# Patient Record
Sex: MALE | Race: WHITE | NOT HISPANIC OR LATINO | Employment: OTHER | URBAN - NONMETROPOLITAN AREA
[De-identification: names, ages, dates, MRNs, and addresses within clinical notes are randomized per-mention and may not be internally consistent; named-entity substitution may affect disease eponyms.]

---

## 2017-01-04 ENCOUNTER — TELEPHONE (OUTPATIENT)
Dept: CARDIOLOGY | Facility: CLINIC | Age: 67
End: 2017-01-04

## 2017-01-04 ENCOUNTER — OFFICE VISIT (OUTPATIENT)
Dept: CARDIOLOGY | Facility: CLINIC | Age: 67
End: 2017-01-04

## 2017-01-04 VITALS
BODY MASS INDEX: 32.78 KG/M2 | SYSTOLIC BLOOD PRESSURE: 144 MMHG | DIASTOLIC BLOOD PRESSURE: 92 MMHG | HEART RATE: 60 BPM | HEIGHT: 72 IN | WEIGHT: 242 LBS

## 2017-01-04 DIAGNOSIS — R53.83 OTHER FATIGUE: Primary | ICD-10-CM

## 2017-01-04 DIAGNOSIS — E78.49 OTHER HYPERLIPIDEMIA: ICD-10-CM

## 2017-01-04 DIAGNOSIS — E03.8 OTHER SPECIFIED HYPOTHYROIDISM: ICD-10-CM

## 2017-01-04 DIAGNOSIS — I25.118 CORONARY ARTERY DISEASE INVOLVING NATIVE CORONARY ARTERY OF NATIVE HEART WITH OTHER FORM OF ANGINA PECTORIS (HCC): ICD-10-CM

## 2017-01-04 DIAGNOSIS — G47.39 OTHER SLEEP APNEA: ICD-10-CM

## 2017-01-04 DIAGNOSIS — I10 ESSENTIAL HYPERTENSION: ICD-10-CM

## 2017-01-04 PROBLEM — I25.10 CAD (CORONARY ARTERY DISEASE): Status: ACTIVE | Noted: 2017-01-04

## 2017-01-04 PROBLEM — E03.9 HYPOTHYROID: Status: ACTIVE | Noted: 2017-01-04

## 2017-01-04 PROBLEM — G47.30 SLEEP APNEA: Status: ACTIVE | Noted: 2017-01-04

## 2017-01-04 PROCEDURE — 93000 ELECTROCARDIOGRAM COMPLETE: CPT | Performed by: NURSE PRACTITIONER

## 2017-01-04 PROCEDURE — 99214 OFFICE O/P EST MOD 30 MIN: CPT | Performed by: NURSE PRACTITIONER

## 2017-01-04 RX ORDER — FENOFIBRIC ACID 135 MG/1
135 CAPSULE, DELAYED RELEASE ORAL DAILY
Qty: 90 CAPSULE | Refills: 2 | Status: SHIPPED | OUTPATIENT
Start: 2017-01-04 | End: 2017-02-27 | Stop reason: SDUPTHER

## 2017-01-04 RX ORDER — MONTELUKAST SODIUM 10 MG/1
10 TABLET ORAL NIGHTLY
COMMUNITY
End: 2017-07-05 | Stop reason: ALTCHOICE

## 2017-01-04 RX ORDER — FLUTICASONE PROPIONATE 50 MCG
2 SPRAY, SUSPENSION (ML) NASAL 2 TIMES DAILY
COMMUNITY
End: 2018-01-03 | Stop reason: ALTCHOICE

## 2017-01-04 RX ORDER — LEVOTHYROXINE SODIUM 0.1 MG/1
100 TABLET ORAL DAILY
COMMUNITY
End: 2018-07-11 | Stop reason: DRUGHIGH

## 2017-01-04 RX ORDER — AMLODIPINE BESYLATE 2.5 MG/1
2.5 TABLET ORAL DAILY
Qty: 30 TABLET | Refills: 8 | Status: SHIPPED | OUTPATIENT
Start: 2017-01-04 | End: 2017-01-04 | Stop reason: SDUPTHER

## 2017-01-04 RX ORDER — SODIUM PHOSPHATE,MONO-DIBASIC 19G-7G/118
ENEMA (ML) RECTAL 3 TIMES DAILY
COMMUNITY
End: 2017-07-05 | Stop reason: ALTCHOICE

## 2017-01-04 RX ORDER — AMLODIPINE BESYLATE 2.5 MG/1
2.5 TABLET ORAL DAILY
Qty: 30 TABLET | Refills: 0 | Status: SHIPPED | OUTPATIENT
Start: 2017-01-04 | End: 2017-01-30 | Stop reason: SDUPTHER

## 2017-01-04 RX ORDER — FENOFIBRIC ACID 135 MG/1
CAPSULE, DELAYED RELEASE ORAL DAILY
COMMUNITY
End: 2017-01-04 | Stop reason: SDUPTHER

## 2017-01-04 NOTE — LETTER
January 4, 2017     JAMEE Lal  [address] [facility] [address]    Patient: Carrington Fontaine   YOB: 1950   Date of Visit: 1/4/2017       Dear JAMEE Lawson:    Carrington Fontaine was in my office today. Below are the relevant portions of my assessment and plan of care.        HR is stable.  BP is still slightly elevated.  We will advise to add a low dose of norvasc daily.  EKG done today for HTN, showed sinus cara with normal QT.  Advised patient to have his CPAP mask checked as seal may not be tight causing leak which could be causing fatigue and hypertension.  We will also advise on repeat cardiac workup with stress and echo to look for any ischemia or decline in LV function that may be causing fatigue.  More recommendations to follow.  Labs and refills with you.  6 month follow up or sooner if needed.      Problems Addressed this Visit        Cardiovascular and Mediastinum    CAD (coronary artery disease)    Relevant Medications    amLODIPine (NORVASC) 2.5 MG tablet    Other Relevant Orders    Stress Test With Myocardial Perfusion One Day    Adult Transthoracic Echo Complete    ECG 12 Lead    HTN (hypertension)    Relevant Medications    amLODIPine (NORVASC) 2.5 MG tablet    Other Relevant Orders    Stress Test With Myocardial Perfusion One Day    Adult Transthoracic Echo Complete       Other    Sleep apnea    Relevant Orders    Stress Test With Myocardial Perfusion One Day    Adult Transthoracic Echo Complete      Other Visit Diagnoses     Other fatigue    -  Primary    Relevant Orders    Stress Test With Myocardial Perfusion One Day    Adult Transthoracic Echo Complete    Other hyperlipidemia        Relevant Medications    fenofibric acid (TRILIPIX) 135 MG capsule delayed-release delayed release capsule    Other specified hypothyroidism        Relevant Medications    levothyroxine (SYNTHROID, LEVOTHROID) 100 MCG tablet              Electronically signed by JAMEE Fish  January 4, 2017 3:47 PM              If you have questions, please do not hesitate to call me. I look forward to following Carrington along with you.         Sincerely,        Aida March, JAMEE        CC: No Recipients

## 2017-01-04 NOTE — TELEPHONE ENCOUNTER
Patient asking if we could send a 30 day supply of norvasc that was added today to his local pharmacy so he can get it sooner. Script sent to [facility] in [place] per patient request.

## 2017-01-04 NOTE — PROGRESS NOTES
Chief Complaint   Patient presents with   • Follow-up     Reports that B/P is still running high after change in Cozaar.  Last labs about 6 months ago at [facility]. Needs refills on Fenofibrate -[facility] 90[facility].       Cardiac Complaints  fatigue      Subjective   Carrington Fontaine is a 66 y.o. male with a history of hypertension and a family history of ischemic heart disease who was referred to Dr. Painter in the past for cardiac evaluation of chest tightness and shortness of breath. He also has sleep apnea for which he uses CPAP. His cardiac workup in 2014 revealed significant coronary artery disease. He was referred to Dr. Harrell and underwent coronary artery bypass grafting ×4. Postoperatively he developed atrial fibrillation. A transesophageal echocardiogram was done and then underwent cardioversion without success. Later, he converted and remained in sinus rhythm so Coumadin and amiodarone were stopped. Later he developed dizziness and metoprolol was stopped.  He returns today for follow up and reports more fatigue and hypertension than prior.  Wife if concerned about symptoms as these were symptoms he had when he had CABG x4.  Labs and refills he reports with PCP except for fenofibrate which he needs today.      Cardiac History  Past Surgical History   Procedure Laterality Date   • Knee surgery Right    • Cyst removal       from mouth   • Colonoscopy     • Echo - converted  08/29/2014     Echo-EF 65%   • Cardiovascular stress test  08/29/2014     Stress-9min, 152/74, inferiolateral ischemia, Imdur, cath   • Cardiac catheterization  09/14/2014     Cardiac Cath-EF 60%, significant 3 vessel disease, refer for bypass.   • Coronary artery bypass graft  09/22/2014     CABG-() LIMA-LAD, SVG-OM, SVG-PDA, SVG-RCA.   • Murali  09/25/2014     MURALI-EF 50%, no mass, unsuccessful cardioversion x 4.   • Umbilical hernia repair         Current Outpatient Prescriptions   Medication Sig Dispense Refill    • aspirin  MG tablet Take 325 mg by mouth daily.     • fenofibric acid (TRILIPIX) 135 MG capsule delayed-release delayed release capsule Take 1 capsule by mouth Daily for 90 days. 90 capsule 2   • finasteride (PROSCAR) 5 MG tablet Take 5 mg by mouth daily.     • FLUoxetine (PROzac) 20 MG capsule Take 20 mg by mouth. Takes 2 tabs QD.     • fluticasone (FLONASE) 50 MCG/ACT nasal spray 2 sprays into each nostril 2 (Two) Times a Day.     • glucosamine-chondroitin 500-400 MG capsule capsule Take  by mouth 3 (Three) Times a Day.     • ibuprofen (ADVIL,MOTRIN) 800 MG tablet Take 800 mg by mouth 3 (Three) Times a Day.     • levothyroxine (SYNTHROID, LEVOTHROID) 100 MCG tablet Take 100 mcg by mouth Daily.     • losartan (COZAAR) 50 MG tablet Take 1 tablet by mouth 2 (Two) Times a Day. 180 tablet 3   • montelukast (SINGULAIR) 10 MG tablet Take 10 mg by mouth Every Night.     • omeprazole (PriLOSEC) 20 MG capsule Take 20 mg by mouth daily.     • tadalafil (CIALIS) 5 MG tablet Take 5 mg by mouth daily as needed for erectile dysfunction.     • amLODIPine (NORVASC) 2.5 MG tablet Take 1 tablet by mouth Daily for 30 days. 30 tablet 8     No current facility-administered medications for this visit.        Lisinopril and Penicillins    Past Medical History   Diagnosis Date   • Arthritis    • Coronary artery disease    • Cyst of mouth      removed    • H/O colonoscopy    • H/O right knee surgery    • Hypertension        Social History     Social History   • Marital status:      Spouse name: N/A   • Number of children: N/A   • Years of education: N/A     Occupational History   • Not on file.     Social History Main Topics   • Smoking status: Former Smoker     Quit date: 1973   • Smokeless tobacco: Never Used      Comment: history of smoking x 5 years, quit at 22 years old..    • Alcohol use Yes      Comment: Socially maybe one glass of wine per month   • Drug use: No   • Sexual activity: Not on file     Other Topics  "Concern   • Not on file     Social History Narrative       Family History   Problem Relation Age of Onset   • Cancer Mother    • Diabetes Mother    • Melanoma Father        Review of Systems   Constitutional: Positive for fatigue.   HENT: Negative.    Respiratory: Negative.    Cardiovascular: Negative.    Musculoskeletal: Negative.    Skin: Negative.    Neurological: Negative.    Psychiatric/Behavioral: Negative.        DiabetesNo  Thyroidabnormal    Objective     Visit Vitals   • /92   • Pulse 60   • Ht 72\" (182.9 cm)   • Wt 242 lb (110 kg)   • BMI 32.82 kg/m2       Physical Exam   Constitutional: He is oriented to person, place, and time. He appears well-developed and well-nourished.   HENT:   Head: Normocephalic and atraumatic.   Eyes: EOM are normal. Pupils are equal, round, and reactive to light.   Cardiovascular: Normal rate and regular rhythm.    Murmur heard.  Pulmonary/Chest: Effort normal and breath sounds normal.   Abdominal: Soft.   Musculoskeletal: Normal range of motion.   Neurological: He is alert and oriented to person, place, and time.   Skin: Skin is warm and dry.   Psychiatric: He has a normal mood and affect.         ECG 12 Lead  Date/Time: 1/4/2017 1:06 PM  Performed by: MANE HANSON  Authorized by: MANE HANSON   Rhythm: sinus bradycardia  BPM: 57  Clinical impression: normal ECG            Assessment/Plan     HR is stable.  BP is still slightly elevated.  We will advise to add a low dose of norvasc daily.  EKG done today for HTN, showed sinus cara with normal QT.  Advised patient to have his CPAP mask checked as seal may not be tight causing leak which could be causing fatigue and hypertension.  We will also advise on repeat cardiac workup with stress and echo to look for any ischemia or decline in LV function that may be causing fatigue.  More recommendations to follow.  Labs and refills with you.  6 month follow up or sooner if needed.      Problems Addressed this Visit        " Cardiovascular and Mediastinum    CAD (coronary artery disease)    Relevant Medications    amLODIPine (NORVASC) 2.5 MG tablet    Other Relevant Orders    Stress Test With Myocardial Perfusion One Day    Adult Transthoracic Echo Complete    ECG 12 Lead    HTN (hypertension)    Relevant Medications    amLODIPine (NORVASC) 2.5 MG tablet    Other Relevant Orders    Stress Test With Myocardial Perfusion One Day    Adult Transthoracic Echo Complete       Other    Sleep apnea    Relevant Orders    Stress Test With Myocardial Perfusion One Day    Adult Transthoracic Echo Complete      Other Visit Diagnoses     Other fatigue    -  Primary    Relevant Orders    Stress Test With Myocardial Perfusion One Day    Adult Transthoracic Echo Complete    Other hyperlipidemia        Relevant Medications    fenofibric acid (TRILIPIX) 135 MG capsule delayed-release delayed release capsule    Other specified hypothyroidism        Relevant Medications    levothyroxine (SYNTHROID, LEVOTHROID) 100 MCG tablet              Electronically signed by Aida March, JAMEE January 4, 2017 3:47 PM

## 2017-01-30 ENCOUNTER — TELEPHONE (OUTPATIENT)
Dept: CARDIOLOGY | Facility: CLINIC | Age: 67
End: 2017-01-30

## 2017-01-30 RX ORDER — AMLODIPINE BESYLATE 2.5 MG/1
2.5 TABLET ORAL DAILY
Qty: 90 TABLET | Refills: 3 | Status: SHIPPED | OUTPATIENT
Start: 2017-01-30 | End: 2017-03-01

## 2017-02-27 ENCOUNTER — TELEPHONE (OUTPATIENT)
Dept: CARDIOLOGY | Facility: CLINIC | Age: 67
End: 2017-02-27

## 2017-02-27 RX ORDER — FENOFIBRIC ACID 135 MG/1
135 CAPSULE, DELAYED RELEASE ORAL DAILY
Qty: 90 CAPSULE | Refills: 2 | Status: SHIPPED | OUTPATIENT
Start: 2017-02-27 | End: 2017-05-28

## 2017-02-27 NOTE — TELEPHONE ENCOUNTER
Patient called in requesting fenofibrate refills to be sent into [facility] pharmacy instead of [facility] pharmacy. Script sent into [facility]. Patient aware.

## 2017-07-05 ENCOUNTER — OFFICE VISIT (OUTPATIENT)
Dept: CARDIOLOGY | Facility: CLINIC | Age: 67
End: 2017-07-05

## 2017-07-05 VITALS
BODY MASS INDEX: 32.78 KG/M2 | HEIGHT: 72 IN | WEIGHT: 242 LBS | SYSTOLIC BLOOD PRESSURE: 148 MMHG | HEART RATE: 64 BPM | DIASTOLIC BLOOD PRESSURE: 90 MMHG

## 2017-07-05 DIAGNOSIS — G47.39 OTHER SLEEP APNEA: ICD-10-CM

## 2017-07-05 DIAGNOSIS — E03.8 OTHER SPECIFIED HYPOTHYROIDISM: ICD-10-CM

## 2017-07-05 DIAGNOSIS — I25.10 CORONARY ARTERY DISEASE INVOLVING NATIVE CORONARY ARTERY OF NATIVE HEART WITHOUT ANGINA PECTORIS: Primary | ICD-10-CM

## 2017-07-05 DIAGNOSIS — I10 ESSENTIAL HYPERTENSION: ICD-10-CM

## 2017-07-05 PROCEDURE — 99213 OFFICE O/P EST LOW 20 MIN: CPT | Performed by: NURSE PRACTITIONER

## 2017-07-05 RX ORDER — AMLODIPINE BESYLATE 5 MG/1
5 TABLET ORAL DAILY
Qty: 90 TABLET | Refills: 3 | Status: SHIPPED | OUTPATIENT
Start: 2017-07-05 | End: 2017-07-05 | Stop reason: SDUPTHER

## 2017-07-05 RX ORDER — AMLODIPINE BESYLATE 5 MG/1
5 TABLET ORAL DAILY
Qty: 90 TABLET | Refills: 3 | Status: SHIPPED | OUTPATIENT
Start: 2017-07-05 | End: 2017-07-11 | Stop reason: SDUPTHER

## 2017-07-05 RX ORDER — AMLODIPINE BESYLATE 2.5 MG/1
2.5 TABLET ORAL DAILY
COMMUNITY
End: 2017-07-05 | Stop reason: DRUGHIGH

## 2017-07-05 NOTE — PROGRESS NOTES
Chief Complaint   Patient presents with   • Follow-up     He denies any chest pain, no palpitation, no dizziness and no shortness of breath. He reports no recent labs. He does not need refill at this time. He states B/P has been elevated some lately, relates to stress.       Cardiac Complaints  none      Subjective   Carrington Fontaine is a 66 y.o. male with a history of hypertension and a family history of ischemic heart disease who was referred to Dr. Painter in the past for cardiac evaluation of chest tightness and shortness of breath. He also has sleep apnea for which he uses CPAP. His cardiac workup in 2014 revealed significant coronary artery disease. He was referred to Dr. Harrell and underwent coronary artery bypass grafting ×4. Postoperatively he developed atrial fibrillation. A transesophageal echocardiogram was done and then underwent cardioversion without success. Later, he converted and remained in sinus rhythm so Coumadin and amiodarone were stopped. Later he developed dizziness and metoprolol was stopped. At last follow up, he reported more fatigue and hypertension than prior.  His wife was concerned since these were the symptoms he had with his CABG.  Cardiac workup with stress and echo was advised and low dose norvasc was added for better blood pressure control.  After visit, he did not proceed with testing as insurance would not pay and he states he could not afford costs.  He returns today for follow up and denies any cardiac concerns.  He states he is very active at home without concerns.  He does have a great deal of stress and states it is causing his blood pressure to be elevated.  Labs he reports with PCP, no refills are needed at present.      Cardiac History  Past Surgical History:   Procedure Laterality Date   • CARDIAC CATHETERIZATION  09/14/2014    Cardiac Cath-EF 60%, significant 3 vessel disease, refer for bypass.   • CARDIOVASCULAR STRESS TEST  08/29/2014    Stress-9min, 152/74,  inferiolateral ischemia, Imdur, cath   • COLONOSCOPY     • CORONARY ARTERY BYPASS GRAFT  09/22/2014    CABG-() LIMA-LAD, SVG-OM, SVG-PDA, SVG-RCA.   • CYST REMOVAL      from mouth   • ECHO - CONVERTED  08/29/2014    Echo-EF 65%   • KNEE SURGERY Right    • BLAIR  09/25/2014    BLAIR-EF 50%, no mass, unsuccessful cardioversion x 4.   • UMBILICAL HERNIA REPAIR         Current Outpatient Prescriptions   Medication Sig Dispense Refill   • ALLERGY SERUM INJECTION Inject  under the skin Every 14 (Fourteen) Days.     • aspirin  MG tablet Take 325 mg by mouth daily.     • finasteride (PROSCAR) 5 MG tablet Take 5 mg by mouth daily.     • FLUoxetine (PROzac) 20 MG capsule Takes 2 tabs QD.     • fluticasone (FLONASE) 50 MCG/ACT nasal spray 2 sprays into each nostril 2 (Two) Times a Day.     • ibuprofen (ADVIL,MOTRIN) 800 MG tablet Take 800 mg by mouth 3 (Three) Times a Day As Needed for Mild Pain (1-3).     • levothyroxine (SYNTHROID, LEVOTHROID) 100 MCG tablet Take 100 mcg by mouth Daily.     • losartan (COZAAR) 50 MG tablet Take 1 tablet by mouth 2 (Two) Times a Day. 180 tablet 3   • amLODIPine (NORVASC) 5 MG tablet Take 1 tablet by mouth Daily for 90 days. 90 tablet 3     No current facility-administered medications for this visit.        Lisinopril and Penicillins    Past Medical History:   Diagnosis Date   • Arthritis    • Coronary artery disease    • Cyst of mouth     removed    • H/O colonoscopy    • H/O right knee surgery    • Hypertension        Social History     Social History   • Marital status:      Spouse name: N/A   • Number of children: N/A   • Years of education: N/A     Occupational History   • Not on file.     Social History Main Topics   • Smoking status: Former Smoker     Quit date: 1973   • Smokeless tobacco: Never Used      Comment: history of smoking x 5 years, quit at 22 years old..    • Alcohol use Yes      Comment: Socially maybe one glass of wine per month   • Drug use: No   • Sexual  "activity: Not on file     Other Topics Concern   • Not on file     Social History Narrative       Family History   Problem Relation Age of Onset   • Cancer Mother    • Diabetes Mother    • Melanoma Father        Review of Systems   Constitution: Negative for weakness and malaise/fatigue.   Cardiovascular: Negative for chest pain, claudication, dyspnea on exertion and leg swelling.   Respiratory: Negative for cough, shortness of breath and wheezing.    Musculoskeletal: Negative for arthritis and back pain.   Gastrointestinal: Negative for anorexia, dysphagia, nausea and vomiting.   Genitourinary: Negative for hematuria, hesitancy and urgency.   Neurological: Negative for dizziness, light-headedness and loss of balance.   Psychiatric/Behavioral: Negative for altered mental status and depression.       DiabetesNo  Thyroidabnormal    Objective     /90 (BP Location: Left arm)  Pulse 64  Ht 72\" (182.9 cm)  Wt 242 lb (110 kg)  BMI 32.82 kg/m2    Physical Exam   Constitutional: He is oriented to person, place, and time. He appears well-developed and well-nourished.   HENT:   Head: Normocephalic and atraumatic.   Eyes: EOM are normal. Pupils are equal, round, and reactive to light.   Neck: Normal range of motion. Neck supple.   Cardiovascular: Normal rate and regular rhythm.    Pulmonary/Chest: Effort normal and breath sounds normal.   Abdominal: Soft.   Musculoskeletal: Normal range of motion.   Neurological: He is alert and oriented to person, place, and time.   Skin: Skin is warm and dry.   Psychiatric: He has a normal mood and affect. His behavior is normal.       Procedures    Assessment/Plan     HR is stable.  BP is elevated still.  We will encourage to increase norvasc to 5mg daily.  We did discuss repeat workup but he states since he is doing well and costs is of concern, he would like to hold off on any further cardiac testing.  Labs he reports with you, could we get next copy.  Good cardiac diet and " activity as tolerated advised as well as limited sodium intake.  If problems should arise, he was advised to call for further recommendations.  6 month follow up will be advised or sooner if needed.      Problems Addressed this Visit        Cardiovascular and Mediastinum    CAD (coronary artery disease) - Primary    Relevant Medications    amLODIPine (NORVASC) 5 MG tablet    HTN (hypertension)    Relevant Medications    amLODIPine (NORVASC) 5 MG tablet       Endocrine    Hypothyroid       Other    Sleep apnea                  Electronically signed by JAMEE Fish July 5, 2017 4:16 PM

## 2017-07-11 ENCOUNTER — TELEPHONE (OUTPATIENT)
Dept: CARDIOLOGY | Facility: CLINIC | Age: 67
End: 2017-07-11

## 2017-07-11 RX ORDER — AMLODIPINE BESYLATE 5 MG/1
5 TABLET ORAL DAILY
Qty: 90 TABLET | Refills: 3 | Status: SHIPPED | OUTPATIENT
Start: 2017-07-11 | End: 2017-10-09

## 2017-11-21 RX ORDER — LOSARTAN POTASSIUM 50 MG/1
TABLET ORAL
Qty: 180 TABLET | Refills: 3 | Status: SHIPPED | OUTPATIENT
Start: 2017-11-21 | End: 2018-07-11 | Stop reason: SDUPTHER

## 2017-12-01 RX ORDER — FENOFIBRIC ACID 135 MG/1
CAPSULE, DELAYED RELEASE ORAL
Qty: 90 CAPSULE | Refills: 2 | OUTPATIENT
Start: 2017-12-01

## 2017-12-11 ENCOUNTER — TELEPHONE (OUTPATIENT)
Dept: CARDIOLOGY | Facility: CLINIC | Age: 67
End: 2017-12-11

## 2017-12-11 NOTE — TELEPHONE ENCOUNTER
Patient called requesting refill on Choline Fenofibrate 135mg daily, was not on last office visit medication list, patient does report that he has not stopped taking. Is it ok to refill? Thanks

## 2017-12-18 ENCOUNTER — TELEPHONE (OUTPATIENT)
Dept: CARDIOLOGY | Facility: CLINIC | Age: 67
End: 2017-12-18

## 2017-12-18 NOTE — TELEPHONE ENCOUNTER
Patient called requesting refill on Trilipix 135mg daily [facility]. Refill on Trilipix 135mg daily sent to pharmacy.

## 2018-01-03 ENCOUNTER — OFFICE VISIT (OUTPATIENT)
Dept: CARDIOLOGY | Facility: CLINIC | Age: 68
End: 2018-01-03

## 2018-01-03 VITALS
SYSTOLIC BLOOD PRESSURE: 116 MMHG | BODY MASS INDEX: 33.05 KG/M2 | WEIGHT: 244 LBS | HEART RATE: 68 BPM | DIASTOLIC BLOOD PRESSURE: 72 MMHG | HEIGHT: 72 IN

## 2018-01-03 DIAGNOSIS — R01.1 MURMUR, CARDIAC: ICD-10-CM

## 2018-01-03 DIAGNOSIS — I10 ESSENTIAL HYPERTENSION: ICD-10-CM

## 2018-01-03 DIAGNOSIS — I25.9 IHD (ISCHEMIC HEART DISEASE): Primary | ICD-10-CM

## 2018-01-03 DIAGNOSIS — G47.30 SLEEP APNEA IN ADULT: ICD-10-CM

## 2018-01-03 DIAGNOSIS — E03.9 ACQUIRED HYPOTHYROIDISM: ICD-10-CM

## 2018-01-03 DIAGNOSIS — E88.81 METABOLIC SYNDROME: ICD-10-CM

## 2018-01-03 DIAGNOSIS — E78.00 HYPERCHOLESTEREMIA: ICD-10-CM

## 2018-01-03 PROBLEM — I25.10 CAD (CORONARY ARTERY DISEASE): Status: RESOLVED | Noted: 2017-01-04 | Resolved: 2018-01-03

## 2018-01-03 PROBLEM — E88.810 METABOLIC SYNDROME: Status: ACTIVE | Noted: 2018-01-03

## 2018-01-03 PROCEDURE — 99213 OFFICE O/P EST LOW 20 MIN: CPT | Performed by: INTERNAL MEDICINE

## 2018-01-03 RX ORDER — MONTELUKAST SODIUM 10 MG/1
10 TABLET ORAL NIGHTLY
COMMUNITY
End: 2018-07-11 | Stop reason: ALTCHOICE

## 2018-01-03 RX ORDER — TADALAFIL 5 MG/1
20 TABLET ORAL DAILY
COMMUNITY
End: 2022-09-27

## 2018-01-03 RX ORDER — OMEPRAZOLE 20 MG/1
20 CAPSULE, DELAYED RELEASE ORAL DAILY
COMMUNITY

## 2018-01-03 RX ORDER — AMLODIPINE BESYLATE 10 MG/1
10 TABLET ORAL DAILY
COMMUNITY
End: 2018-07-11 | Stop reason: ALTCHOICE

## 2018-01-03 NOTE — PROGRESS NOTES
Chief Complaint   Patient presents with   • Follow-up     For cardiac management. No recent labs. [facility] states [facility]. He reports does not need refills at this time.       CARDIAC COMPLAINTS  Cardiac Management        Subjective   Carrington Fontaine is a 67 y.o. male who came in today for his follow up visit.  He has history of IHD, diagnosed in 2014 when he presented with CP and abnormal stress test.  He was found to have significant 3 vessel disease.  He underwent bypass surgery without any problems.  He came today for his follow up visit.  He denies any new symptoms.  No CP or SOB.  He has still not lost any weight.  His BP is doing much better after his house has been built.  His lab work is followed at your office.  During his last couple of visit, he was advised to undergo repeat stress test, but he has hesitated because of his co pay is high.              Cardiac History  Past Surgical History:   Procedure Laterality Date   • CARDIAC CATHETERIZATION  09/14/2014    Cardiac Cath-EF 60%, significant 3 vessel disease, refer for bypass.   • CARDIOVASCULAR STRESS TEST  08/29/2014    Stress-9min, 152/74, inferiolateral ischemia, Imdur, cath   • COLONOSCOPY     • CORONARY ARTERY BYPASS GRAFT  09/22/2014    CABG-() LIMA-LAD, SVG-OM, SVG-PDA, SVG-RCA.   • CYST REMOVAL      from mouth   • ECHO - CONVERTED  08/29/2014    Echo-EF 65%   • KNEE SURGERY Right    • BLAIR  09/25/2014    BLAIR-EF 50%, no mass, unsuccessful cardioversion x 4.   • UMBILICAL HERNIA REPAIR         Current Outpatient Prescriptions   Medication Sig Dispense Refill   • ALLERGY SERUM INJECTION Inject  under the skin Every 14 (Fourteen) Days.     • amLODIPine (NORVASC) 10 MG tablet Take 10 mg by mouth Daily.     • aspirin  MG tablet Take 325 mg by mouth daily.     • Beclomethasone Dipropionate 80 MCG/ACT aerosol solution 2 sprays into each nostril Daily.     • choline fenofibrate (TRILIPIX) 135 MG capsule Take 1 capsule by mouth Daily.  90 capsule 0   • finasteride (PROSCAR) 5 MG tablet Take 5 mg by mouth daily.     • FLUoxetine (PROzac) 20 MG capsule Takes 2 tabs QD.     • ibuprofen (ADVIL,MOTRIN) 800 MG tablet Take 800 mg by mouth 3 (Three) Times a Day As Needed for Mild Pain (1-3).     • levothyroxine (SYNTHROID, LEVOTHROID) 100 MCG tablet Take 100 mcg by mouth Daily.     • losartan (COZAAR) 50 MG tablet TAKE 1 TABLET TWICE A DAY (DOSE INCREASE) 180 tablet 3   • montelukast (SINGULAIR) 10 MG tablet Take 10 mg by mouth Every Night.     • omeprazole (priLOSEC) 20 MG capsule Take 20 mg by mouth Daily.     • tadalafil (CIALIS) 5 MG tablet Take 5 mg by mouth Daily As Needed for erectile dysfunction.       No current facility-administered medications for this visit.        Allergies  :  Lisinopril and Penicillins       Past Medical History:   Diagnosis Date   • Arthritis    • Coronary artery disease    • Cyst of mouth     removed    • H/O colonoscopy    • H/O right knee surgery    • Hypertension        Social History     Social History   • Marital status:      Spouse name: N/A   • Number of children: N/A   • Years of education: N/A     Occupational History   • Not on file.     Social History Main Topics   • Smoking status: Former Smoker     Quit date: 1973   • Smokeless tobacco: Never Used      Comment: history of smoking x 5 years, quit at 22 years old..    • Alcohol use Yes      Comment: Socially maybe one glass of wine per month   • Drug use: No   • Sexual activity: Not on file     Other Topics Concern   • Not on file     Social History Narrative       Family History   Problem Relation Age of Onset   • Cancer Mother    • Diabetes Mother    • Melanoma Father        Review of Systems   Constitution: Negative for decreased appetite and malaise/fatigue.   HENT: Negative for congestion and sore throat.    Eyes: Negative for blurred vision.   Cardiovascular: Negative for chest pain and dyspnea on exertion.   Respiratory: Negative for shortness of  "breath and snoring.    Endocrine: Negative for cold intolerance and heat intolerance.   Hematologic/Lymphatic: Negative for adenopathy. Does not bruise/bleed easily.   Skin: Negative for itching, nail changes and skin cancer.   Musculoskeletal: Negative for arthritis and myalgias.   Gastrointestinal: Negative for abdominal pain, dysphagia and heartburn.   Genitourinary: Negative for bladder incontinence and frequency.   Neurological: Negative for dizziness, light-headedness, seizures and vertigo.   Psychiatric/Behavioral: Negative for altered mental status.   Allergic/Immunologic: Negative for environmental allergies and hives.       Diabetes- No  Thyroid- Abnormal    Objective     /72  Pulse 68  Ht 182.9 cm (72.01\")  Wt 111 kg (244 lb)  BMI 33.08 kg/m2    Physical Exam   Constitutional: He is oriented to person, place, and time. He appears well-developed and well-nourished.   HENT:   Head: Normocephalic.   Eyes: EOM are normal. Pupils are equal, round, and reactive to light.   Neck: Normal range of motion.   Cardiovascular: Normal rate, regular rhythm, S1 normal and S2 normal.    Murmur heard.  Pulmonary/Chest: Effort normal and breath sounds normal.   Abdominal: Soft. Bowel sounds are normal.   Musculoskeletal: Normal range of motion. He exhibits no edema.   Neurological: He is alert and oriented to person, place, and time.   Skin: Skin is warm and dry.   Psychiatric: He has a normal mood and affect.     Procedures            Assessment/Plan     Carrington was seen today for follow-up.    Diagnoses and all orders for this visit:    IHD (ischemic heart disease)    Essential hypertension    Hypercholesteremia    Metabolic syndrome    Sleep apnea in adult    Murmur, cardiac    Acquired hypothyroidism         His HR and BP is stable.  At this time, I did not change any of his medications.  I explained to him the reason why a stress test was suggested during his last visit.  As long as he is not having any " symptoms, we can wait at least till 5 years after the surgery before we do the stress test and echo.  But I did talk to him about his weight.  I talked to him about low carb diet.  I will see him back in 6 months.  If his weight is not coming down or if he has any new symptoms, then we will do the stress test.  At this time, his cardiac status appears stable.              Electronically signed by Irene Painter MD January 3, 2018 12:04 PM

## 2018-03-20 RX ORDER — FENOFIBRIC ACID 135 MG/1
CAPSULE, DELAYED RELEASE ORAL
Qty: 90 CAPSULE | Refills: 0 | Status: SHIPPED | OUTPATIENT
Start: 2018-03-20 | End: 2018-06-19 | Stop reason: SDUPTHER

## 2018-03-20 NOTE — TELEPHONE ENCOUNTER
Aida   Received refill request on Trilipix 135mg no recent labs on file. Is it okay to refill? Thank you.

## 2018-06-19 RX ORDER — FENOFIBRIC ACID 135 MG/1
CAPSULE, DELAYED RELEASE ORAL
Qty: 90 CAPSULE | Refills: 0 | Status: SHIPPED | OUTPATIENT
Start: 2018-06-19 | End: 2018-07-11 | Stop reason: SDUPTHER

## 2018-07-11 ENCOUNTER — OFFICE VISIT (OUTPATIENT)
Dept: CARDIOLOGY | Facility: CLINIC | Age: 68
End: 2018-07-11

## 2018-07-11 ENCOUNTER — TELEPHONE (OUTPATIENT)
Dept: CARDIOLOGY | Facility: CLINIC | Age: 68
End: 2018-07-11

## 2018-07-11 VITALS
BODY MASS INDEX: 34.27 KG/M2 | HEIGHT: 72 IN | HEART RATE: 64 BPM | WEIGHT: 253 LBS | SYSTOLIC BLOOD PRESSURE: 130 MMHG | DIASTOLIC BLOOD PRESSURE: 80 MMHG

## 2018-07-11 DIAGNOSIS — I25.9 IHD (ISCHEMIC HEART DISEASE): ICD-10-CM

## 2018-07-11 DIAGNOSIS — I10 ESSENTIAL HYPERTENSION: Primary | ICD-10-CM

## 2018-07-11 DIAGNOSIS — E88.81 METABOLIC SYNDROME: ICD-10-CM

## 2018-07-11 DIAGNOSIS — E78.00 HYPERCHOLESTEREMIA: ICD-10-CM

## 2018-07-11 PROCEDURE — 99213 OFFICE O/P EST LOW 20 MIN: CPT | Performed by: NURSE PRACTITIONER

## 2018-07-11 RX ORDER — LOSARTAN POTASSIUM 50 MG/1
50 TABLET ORAL 2 TIMES DAILY
Qty: 180 TABLET | Refills: 3 | Status: SHIPPED | OUTPATIENT
Start: 2018-07-11 | End: 2019-07-14 | Stop reason: SDUPTHER

## 2018-07-11 RX ORDER — LEVOTHYROXINE SODIUM 112 UG/1
112 TABLET ORAL DAILY
COMMUNITY
End: 2019-01-09 | Stop reason: DRUGHIGH

## 2018-07-11 RX ORDER — FENOFIBRIC ACID 135 MG/1
135 CAPSULE, DELAYED RELEASE ORAL DAILY
Qty: 90 CAPSULE | Refills: 3 | Status: SHIPPED | OUTPATIENT
Start: 2018-07-11 | End: 2019-07-14 | Stop reason: SDUPTHER

## 2018-07-11 RX ORDER — FLUTICASONE PROPIONATE 50 MCG
2 SPRAY, SUSPENSION (ML) NASAL DAILY
COMMUNITY
End: 2020-07-15 | Stop reason: ALTCHOICE

## 2018-07-11 NOTE — TELEPHONE ENCOUNTER
Cara,    Can we check with [facility] and obtain labs done about one month ago?    Looking for lipids.

## 2018-07-11 NOTE — PROGRESS NOTES
Chief Complaint   Patient presents with   • Follow-up     Cardiac management. Last labs [facility] in the last month, he reports everything normal except thyroid. Increased dose of Synthroid.   • Med Refill     Needs refills on Fenofibrate and Losartan-90 day.       Subjective       Carrington Fontaine is a 67 y.o. male with a history of hypertension, hyperlipidemia, and IHD diagnosed in 2014 when he presented with chest pain found to have inferolateral ischemia on stress. Cardiac cath revealed multivessel disease and he underwent 4 vessel CABG.  He developed post op atrial fib and underwent BLAIR and cardioversion without success. Later, he converted on his own and remained in sinus, so Coumadin and amiodarone were stopped. He has been managed conservatively and has done very well.  He came in today for his regular follow up visit. 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  He denies chest pain, shortness of breath, palpitations or dizziness.  Metoprolol was discontinued previously secondary to dizziness and he recently stopped amlodipine secondary to pedal edema.Blood pressure monitored at home has been stable.  We have not repeated the stress test since his copayment is high and he has remained mostly asymptomatic. Labs are monitored by PCP and he is now following with Dr. Morrow. Reviewing medications, he is not on statin. He claims he stopped this on his own secondary to joint pain which did not improve after stopping.  Refills requested on losartan and fenofibrate.     HPI         Cardiac History:    Past Surgical History:   Procedure Laterality Date   • CARDIAC CATHETERIZATION  09/14/2014    80% LAD, 70% D1 &D2, 80% OM, 75% RCA.   • CARDIOVASCULAR STRESS TEST  08/29/2014    9min, 152/74, inferiolateral ischemia, Imdur, cath   • CORONARY ARTERY BYPASS GRAFT  09/22/2014    CABG-() LIMA-LAD, SVG-OM, SVG-PDA, SVG-RCA.   • ECHO - CONVERTED  08/29/2014    Echo-EF 65%   • TRANSESOPHAGEAL ECHOCARDIOGRAM  (BLAIR) AND CARDIOVERSION  09/25/2014    @ [facility] 50%, no mass, unsuccessful cardioversion x 4.       Current Outpatient Prescriptions   Medication Sig Dispense Refill   • ALLERGY SERUM INJECTION Inject  under the skin Every 21 (Twenty-One) Days.     • aspirin  MG tablet Take 325 mg by mouth daily.     • fenofibric acid (TRILIPIX) 135 MG capsule delayed-release delayed release capsule Take 1 capsule by mouth Daily. 90 capsule 3   • FLUoxetine (PROzac) 20 MG capsule Take 20 mg by mouth 2 (Two) Times a Day.     • fluticasone (FLONASE) 50 MCG/ACT nasal spray 2 sprays into each nostril 2 (Two) Times a Day.     • ibuprofen (ADVIL,MOTRIN) 800 MG tablet Take 800 mg by mouth 3 (Three) Times a Day As Needed for Mild Pain (1-3).     • levothyroxine (SYNTHROID, LEVOTHROID) 112 MCG tablet Take 112 mcg by mouth Daily.     • losartan (COZAAR) 50 MG tablet Take 1 tablet by mouth 2 (Two) Times a Day. 180 tablet 3   • omeprazole (priLOSEC) 20 MG capsule Take 20 mg by mouth Daily.     • tadalafil (CIALIS) 5 MG tablet Take 5 mg by mouth Daily.       No current facility-administered medications for this visit.        Lisinopril and Penicillins    Past Medical History:   Diagnosis Date   • Arthritis    • Coronary artery disease    • Cyst of mouth     removed    • H/O colonoscopy    • H/O right knee surgery    • Hypertension        Social History     Social History   • Marital status:      Spouse name: N/A   • Number of children: N/A   • Years of education: N/A     Occupational History   • Not on file.     Social History Main Topics   • Smoking status: Former Smoker     Quit date: 1973   • Smokeless tobacco: Never Used      Comment: history of smoking x 5 years, quit at 22 years old..    • Alcohol use Yes      Comment: rarely   • Drug use: No   • Sexual activity: Not on file     Other Topics Concern   • Not on file     Social History Narrative   • No narrative on file       Family History   Problem Relation Age of Onset   •  "Cancer Mother    • Diabetes Mother    • Melanoma Father        Review of Systems   Constitution: Positive for weight gain (9 lb increase). Negative for decreased appetite.   HENT: Negative.    Eyes: Negative.    Cardiovascular: Negative for chest pain, dyspnea on exertion, leg swelling, orthopnea, palpitations and syncope.   Respiratory: Negative for cough and shortness of breath.    Endocrine: Negative.    Hematologic/Lymphatic: Negative for bleeding problem. Does not bruise/bleed easily.   Skin: Negative.    Musculoskeletal: Positive for joint pain. Negative for myalgias.   Gastrointestinal: Negative for abdominal pain and melena.   Genitourinary: Negative for dysuria and hematuria.   Neurological: Negative for dizziness.   Psychiatric/Behavioral: Negative for altered mental status and depression.   Allergic/Immunologic: Negative.       Diabetes- No  Thyroid-normal    Objective     /80 (BP Location: Right arm)   Pulse 64   Ht 182.9 cm (72.01\")   Wt 115 kg (253 lb)   BMI 34.31 kg/m²     Physical Exam   Constitutional: He is oriented to person, place, and time. He appears well-developed and well-nourished.   HENT:   Head: Normocephalic.   Eyes: Pupils are equal, round, and reactive to light.   Neck: Normal range of motion.   Cardiovascular: Normal rate, regular rhythm and intact distal pulses.    Pulmonary/Chest: Effort normal and breath sounds normal. No respiratory distress. He has no wheezes. He has no rales.   Abdominal: Soft. Bowel sounds are normal.   Musculoskeletal: Normal range of motion. He exhibits no edema.   Neurological: He is alert and oriented to person, place, and time.   Skin: Skin is warm and dry.   Psychiatric: He has a normal mood and affect.   Nursing note and vitals reviewed.     Procedures          Assessment/Plan    Heart rate and blood pressure stable. Blood pressure appears to be well controlled without amlodipine. We will try to obtain copy of recent labs. I explained the " importance of statin therapy in the presence of ASCVD. If the LDL is elevated, recommend he resume the statin if he can tolerate.  He agreed to this. He remains asymptomatic and reports he is very active walking more than 10,000 steps most days. We discussed repeating the stress five years post bypass surgery but not ordered today as he is having no problems. His weight has increased with Body mass index is 34.31 kg/m².  Encouraged heart healthy diet, low in saturated fat and carbs.  Increase protein and water intake.  He takes daily aspirin 325 mg without signs of bleeding. Refills sent for fenofibrate and losartan. He appears to be stable. We will see him back in six months or sooner if needed.     Carrington was seen today for follow-up and med refill.    Diagnoses and all orders for this visit:    Essential hypertension    IHD (ischemic heart disease)    Hypercholesteremia    Metabolic syndrome    Other orders  -     losartan (COZAAR) 50 MG tablet; Take 1 tablet by mouth 2 (Two) Times a Day.  -     fenofibric acid (TRILIPIX) 135 MG capsule delayed-release delayed release capsule; Take 1 capsule by mouth Daily.        Patient's Body mass index is 34.31 kg/m². BMI is above normal parameters. Recommendations include: nutrition counseling.                    Electronically signed by JAMEE Leslie,  July 11, 2018 4:39 PM

## 2018-07-17 RX ORDER — ATORVASTATIN CALCIUM 10 MG/1
10 TABLET, FILM COATED ORAL DAILY
Qty: 90 TABLET | Refills: 2 | Status: SHIPPED | OUTPATIENT
Start: 2018-07-17 | End: 2018-07-24 | Stop reason: SDUPTHER

## 2018-07-24 ENCOUNTER — TELEPHONE (OUTPATIENT)
Dept: CARDIOLOGY | Facility: CLINIC | Age: 68
End: 2018-07-24

## 2018-07-24 RX ORDER — ATORVASTATIN CALCIUM 10 MG/1
10 TABLET, FILM COATED ORAL DAILY
Qty: 90 TABLET | Refills: 2 | Status: SHIPPED | OUTPATIENT
Start: 2018-07-24 | End: 2019-01-09 | Stop reason: ALTCHOICE

## 2018-07-24 NOTE — TELEPHONE ENCOUNTER
Patient called requesting refill on Atorvastatin sent to [facility], refill went to [facility] of [facility]. Refill on At[facility]atin [facility].

## 2019-01-04 NOTE — PROGRESS NOTES
Chief Complaint   Patient presents with   • Follow-up     Cardiac management. PCP started Cartia XT 120mg daily due to elevated B/P.  Last labs a week ago per PCP. No refills needed at this time. He has recently received treatment for URI.       Cardiac Complaints  none      Subjective   Carrington Fontaine is a 68 y.o. male with hypertension, hyperlipidemia, and IHD diagnosed in 2014 when he presented with chest pain found to have inferolateral ischemia on stress. Cardiac cath revealed multivessel disease and he underwent 4 vessel CABG.  He developed post op atrial fib and underwent BLAIR and cardioversion without success. Later, he converted on his own and remained in sinus, so Coumadin and amiodarone were stopped. He has been managed conservatively and has done very well. Last visit, he reported active lifestyle managing his property without concerns. Metoprolol was discontinued previously secondary to dizziness and he stopped amlodipine secondary to pedal edema. Patient returns today for follow up and reports doing well.  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  He does admit cartia was recently added for HTN management and he states so far he has tolerated well.  He denies chest pain, palpitations, shortness of breath, and dizziness.  Labs he admits are monitored by PCP and states most recent a few weeks ago, no copy available for review.  No refills needed.  He has recently been treated with URI.          Cardiac History  Past Surgical History:   Procedure Laterality Date   • CARDIAC CATHETERIZATION  09/14/2014    80% LAD, 70% D1 &D2, 80% OM, 75% RCA.   • CARDIOVASCULAR STRESS TEST  08/29/2014    9min, 152/74, inferiolateral ischemia, Imdur, cath   • CORONARY ARTERY BYPASS GRAFT  09/22/2014    CABG-() LIMA-LAD, SVG-OM, SVG-PDA, SVG-RCA.   • ECHO - CONVERTED  08/29/2014    Echo-EF 65%   • TRANSESOPHAGEAL ECHOCARDIOGRAM (BLAIR) AND CARDIOVERSION  09/25/2014     @ [facility]- EF 50%, no mass, unsuccessful cardioversion x 4.       Current Outpatient Medications   Medication Sig Dispense Refill   • ALLERGY SERUM INJECTION Inject  under the skin Every 21 (Twenty-One) Days.     • aspirin  MG tablet Take 325 mg by mouth daily.     • diltiaZEM CD (CARTIA XT) 120 MG 24 hr capsule Take 120 mg by mouth Daily.     • fenofibric acid (TRILIPIX) 135 MG capsule delayed-release delayed release capsule Take 1 capsule by mouth Daily. 90 capsule 3   • FLUoxetine (PROzac) 20 MG capsule Take 20 mg by mouth 2 (Two) Times a Day.     • fluticasone (FLONASE) 50 MCG/ACT nasal spray 2 sprays into each nostril 2 (Two) Times a Day.     • ibuprofen (ADVIL,MOTRIN) 800 MG tablet Take 800 mg by mouth 3 (Three) Times a Day As Needed for Mild Pain (1-3).     • levothyroxine (SYNTHROID, LEVOTHROID) 125 MCG tablet Take 125 mcg by mouth Daily.     • losartan (COZAAR) 50 MG tablet Take 1 tablet by mouth 2 (Two) Times a Day. 180 tablet 3   • omeprazole (priLOSEC) 20 MG capsule Take 40 mg by mouth Daily.     • rosuvastatin (CRESTOR) 10 MG tablet Take 10 mg by mouth Daily.     • tadalafil (CIALIS) 5 MG tablet Take 5 mg by mouth Daily.       No current facility-administered medications for this visit.        Lisinopril and Penicillins    Past Medical History:   Diagnosis Date   • Arthritis    • Coronary artery disease    • Cyst of mouth     removed    • H/O colonoscopy    • H/O right knee surgery    • Hypertension        Social History     Socioeconomic History   • Marital status:      Spouse name: Not on file   • Number of children: Not on file   • Years of education: Not on file   • Highest education level: Not on file   Social Needs   • Financial resource strain: Not on file   • Food insecurity - worry: Not on file   • Food insecurity - inability: Not on file   • Transportation needs - medical: Not on file   • Transportation needs - non-medical: Not on file   Occupational History   • Not on file  "  Tobacco Use   • Smoking status: Former Smoker     Last attempt to quit: 1973     Years since quittin.0   • Smokeless tobacco: Never Used   • Tobacco comment: history of smoking x 5 years, quit at 22 years old..    Substance and Sexual Activity   • Alcohol use: No     Frequency: Never     Comment: rarely   • Drug use: No   • Sexual activity: Not on file   Other Topics Concern   • Not on file   Social History Narrative   • Not on file       Family History   Problem Relation Age of Onset   • Cancer Mother    • Diabetes Mother    • Melanoma Father        Review of Systems   Constitution: Negative for weakness and malaise/fatigue.   HENT: Positive for congestion. Negative for nosebleeds and sore throat.    Cardiovascular: Negative for chest pain, dyspnea on exertion, irregular heartbeat, orthopnea, palpitations and syncope.   Respiratory: Negative for cough, shortness of breath and wheezing.    Musculoskeletal: Negative for back pain, joint pain and joint swelling.   Gastrointestinal: Negative for anorexia, heartburn, nausea and vomiting.   Genitourinary: Negative for dysuria, hematuria, hesitancy and nocturia.   Neurological: Negative for dizziness, light-headedness and loss of balance.   Psychiatric/Behavioral: Negative for depression and memory loss. The patient is not nervous/anxious.            Objective     /80 (BP Location: Left arm)   Pulse 68   Ht 182.9 cm (72.01\")   Wt 116 kg (255 lb)   BMI 34.58 kg/m²     Physical Exam   Constitutional: He is oriented to person, place, and time. He appears well-developed and well-nourished.   HENT:   Head: Normocephalic and atraumatic.   Eyes: EOM are normal. Pupils are equal, round, and reactive to light.   Neck: Normal range of motion. Neck supple.   Cardiovascular: Normal rate and regular rhythm.   Murmur heard.  Pulmonary/Chest: Effort normal and breath sounds normal.   Abdominal: Soft.   Musculoskeletal: Normal range of motion.   Neurological: He is alert " and oriented to person, place, and time.   Skin: Skin is warm and dry.   Psychiatric: He has a normal mood and affect. His behavior is normal.       Procedures    Assessment/Plan     HR stable today and regular.  He has remained off medication for PAF has he has maintained NSR.  He does continue with ASA daily and tolerates well.  No bleeding or bruising reported.  HTN is currently well managed and patient reports tolerating cartia well without concerns, no adjustment advised.  Lipids remain managed with trilipix and crestor therapy which he tolerates well. He reports FLP recently checked with your office and all looked okay.  Could we have most recent for our review? We discussed repeat cardiac workup again today with history of IHD and CABG and length of time since last testing as it has been 5 years since last testing.  Patient declines further workup today as he reports active lifestyle without concerns and does not feel repeat workup warranted.  He will call if concerns should develop.  He continues on CPAP for management of sleep apnea and tolerates well.  No refills currently needed as he reports with PCP.  BMI stable elevated at 34.58, good cardiac diet with limited caloric intake, carbs, and saturated fat advised.  6 month follow up scheduled or sooner if needed.        Problems Addressed this Visit        Cardiovascular and Mediastinum    IHD (ischemic heart disease) - Primary    Relevant Medications    diltiaZEM CD (CARTIA XT) 120 MG 24 hr capsule    Essential hypertension    Relevant Medications    diltiaZEM CD (CARTIA XT) 120 MG 24 hr capsule    Hypercholesteremia    Relevant Medications    rosuvastatin (CRESTOR) 10 MG tablet       Respiratory    Sleep apnea in adult       Other    Metabolic syndrome      Other Visit Diagnoses     Obesity (BMI 30-39.9)              Patient's Body mass index is 34.58 kg/m². BMI is above normal parameters. Recommendations include: nutrition  counseling.          Electronically signed by JAMEE Fish January 9, 2019 5:35 PM

## 2019-01-09 ENCOUNTER — OFFICE VISIT (OUTPATIENT)
Dept: CARDIOLOGY | Facility: CLINIC | Age: 69
End: 2019-01-09

## 2019-01-09 VITALS
HEIGHT: 72 IN | HEART RATE: 68 BPM | SYSTOLIC BLOOD PRESSURE: 128 MMHG | WEIGHT: 255 LBS | DIASTOLIC BLOOD PRESSURE: 80 MMHG | BODY MASS INDEX: 34.54 KG/M2

## 2019-01-09 DIAGNOSIS — E78.00 HYPERCHOLESTEREMIA: ICD-10-CM

## 2019-01-09 DIAGNOSIS — I25.9 IHD (ISCHEMIC HEART DISEASE): Primary | ICD-10-CM

## 2019-01-09 DIAGNOSIS — E66.9 OBESITY (BMI 30-39.9): ICD-10-CM

## 2019-01-09 DIAGNOSIS — G47.30 SLEEP APNEA IN ADULT: ICD-10-CM

## 2019-01-09 DIAGNOSIS — E88.81 METABOLIC SYNDROME: ICD-10-CM

## 2019-01-09 DIAGNOSIS — I10 ESSENTIAL HYPERTENSION: ICD-10-CM

## 2019-01-09 PROCEDURE — 99213 OFFICE O/P EST LOW 20 MIN: CPT | Performed by: NURSE PRACTITIONER

## 2019-01-09 RX ORDER — LEVOTHYROXINE SODIUM 0.12 MG/1
125 TABLET ORAL DAILY
COMMUNITY
End: 2019-07-14 | Stop reason: ALTCHOICE

## 2019-01-09 RX ORDER — ROSUVASTATIN CALCIUM 10 MG/1
10 TABLET, COATED ORAL DAILY
COMMUNITY
End: 2021-06-21 | Stop reason: SDUPTHER

## 2019-01-09 RX ORDER — DILTIAZEM HYDROCHLORIDE 120 MG/1
120 CAPSULE, COATED, EXTENDED RELEASE ORAL DAILY
COMMUNITY
End: 2019-07-22 | Stop reason: ALTCHOICE

## 2019-07-10 ENCOUNTER — OFFICE VISIT (OUTPATIENT)
Dept: CARDIOLOGY | Facility: CLINIC | Age: 69
End: 2019-07-10

## 2019-07-10 VITALS
DIASTOLIC BLOOD PRESSURE: 60 MMHG | WEIGHT: 240.2 LBS | HEIGHT: 72 IN | SYSTOLIC BLOOD PRESSURE: 100 MMHG | HEART RATE: 68 BPM | BODY MASS INDEX: 32.53 KG/M2

## 2019-07-10 DIAGNOSIS — I20.8 STABLE ANGINA PECTORIS (HCC): ICD-10-CM

## 2019-07-10 DIAGNOSIS — I25.9 IHD (ISCHEMIC HEART DISEASE): Primary | ICD-10-CM

## 2019-07-10 DIAGNOSIS — E78.00 HYPERCHOLESTEREMIA: ICD-10-CM

## 2019-07-10 DIAGNOSIS — E88.81 METABOLIC SYNDROME: ICD-10-CM

## 2019-07-10 DIAGNOSIS — R01.1 MURMUR, CARDIAC: ICD-10-CM

## 2019-07-10 DIAGNOSIS — I10 ESSENTIAL HYPERTENSION: ICD-10-CM

## 2019-07-10 PROCEDURE — 99214 OFFICE O/P EST MOD 30 MIN: CPT | Performed by: NURSE PRACTITIONER

## 2019-07-10 RX ORDER — LEVOTHYROXINE SODIUM 0.15 MG/1
150 TABLET ORAL DAILY
COMMUNITY

## 2019-07-10 RX ORDER — METHOCARBAMOL 500 MG/1
500 TABLET, FILM COATED ORAL 3 TIMES DAILY
COMMUNITY
End: 2020-01-15 | Stop reason: ALTCHOICE

## 2019-07-10 NOTE — PROGRESS NOTES
"Chief Complaint   Patient presents with   • Follow-up     Cardiac management. He has cut back on food intake and stopped eating sweats, has lost weight. Last labs a week ago per [facility].   • Med Refill     Needs refills on Trilipix and Losartan-90 day.       Subjective       Carrington Fontaine is a 68 y.o. male with hypertension, hyperlipidemia, and IHD diagnosed in 2014 when he presented with chest pain found to have inferolateral ischemia on stress. Cardiac cath revealed multivessel disease and he underwent 4 vessel CABG.  He developed post op atrial fib and underwent BLAIR and cardioversion without success. Later, he converted on his own and remained in sinus, so Coumadin and amiodarone were stopped. He has been managed conservatively and has done very well. Last visit, he reported active lifestyle managing his property without concerns. Metoprolol was discontinued previously secondary to dizziness and he stopped amlodipine secondary to pedal edema. He came today for follow up. He maintains high level of activity. 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 No chest pain, no dyspnea. After resting, symptoms subsided. Did not use nitro. His symptoms described are similar to what he had prior to bypass. He is now willing to proceed with stress test. He is watching diet, lost 15 pounds with limiting sugars. Labs are followed closely with Dr. Morrow. On 3/27/19 lipids well controlled at , tri 74, LDL 50, HDL 44. Glucose 119, normal renal function, LFT. TSH 2.03.     HPI         Cardiac History:    Past Surgical History:   Procedure Laterality Date   • CARDIAC CATHETERIZATION  09/14/2014    80% LAD, 70% D1 &D2, 80% OM, 75% RCA.   • CARDIOVASCULAR STRESS TEST  08/29/2014    9min, 152/74, inferiolateral ischemia, Imdur, cath   • CORONARY ARTERY BYPASS GRAFT  09/22/2014    CABG-() LIMA-LAD, SVG-OM, SVG-PDA, SVG-RCA.   • ECHO - CONVERTED  08/29/2014    " Echo-EF 65%   • TRANSESOPHAGEAL ECHOCARDIOGRAM (BLAIR) AND CARDIOVERSION  09/25/2014    @ [facility]- EF 50%, no mass, unsuccessful cardioversion x 4.       Current Outpatient Medications   Medication Sig Dispense Refill   • ALLERGY SERUM INJECTION Inject  under the skin Every 21 (Twenty-One) Days.     • diltiaZEM CD (CARTIA XT) 120 MG 24 hr capsule Take 120 mg by mouth Daily.     • fenofibric acid (TRILIPIX) 135 MG capsule delayed-release delayed release capsule Take 1 capsule by mouth Daily. 90 capsule 3   • FLUoxetine (PROzac) 20 MG capsule Take 20 mg by mouth 2 (Two) Times a Day.     • fluticasone (FLONASE) 50 MCG/ACT nasal spray 2 sprays into the nostril(s) as directed by provider Daily.     • ibuprofen (ADVIL,MOTRIN) 800 MG tablet Take 800 mg by mouth 3 (Three) Times a Day As Needed for Mild Pain (1-3).     • levothyroxine (SYNTHROID, LEVOTHROID) 137 MCG tablet Take 137 mcg by mouth Daily.     • losartan (COZAAR) 50 MG tablet Take 1 tablet by mouth 2 (Two) Times a Day. 180 tablet 3   • methocarbamol (ROBAXIN) 500 MG tablet Take 500 mg by mouth 3 (Three) Times a Day.     • naltrexone-bupropion ER (CONTRAVE) 8-90 MG tablet Take 1 tablet by mouth Daily.     • omeprazole (priLOSEC) 20 MG capsule Take 40 mg by mouth Daily.     • rosuvastatin (CRESTOR) 10 MG tablet Take 10 mg by mouth Daily.     • tadalafil (CIALIS) 5 MG tablet Take 5 mg by mouth Daily.     • aspirin  MG tablet Take 325 mg by mouth daily.     • levothyroxine (SYNTHROID, LEVOTHROID) 125 MCG tablet Take 125 mcg by mouth Daily.       No current facility-administered medications for this visit.        Lisinopril and Penicillins    Past Medical History:   Diagnosis Date   • Arthritis    • Coronary artery disease    • Cyst of mouth     removed    • H/O colonoscopy    • H/O right knee surgery    • Hypertension      Social History     Socioeconomic History   • Marital status:      Spouse name: Not on file   • Number of children: Not on file   • Years of  "education: Not on file   • Highest education level: Not on file   Tobacco Use   • Smoking status: Former Smoker     Last attempt to quit:      Years since quittin.5   • Smokeless tobacco: Never Used   • Tobacco comment: history of smoking x 5 years, quit at 22 years old..    Substance and Sexual Activity   • Alcohol use: No     Frequency: Never     Comment: rarely   • Drug use: No     Family History   Problem Relation Age of Onset   • Cancer Mother    • Diabetes Mother    • Melanoma Father      Review of Systems   Constitution: Positive for malaise/fatigue and weight loss (intentional 15 lb weight loss ). Negative for decreased appetite.   HENT: Negative.    Eyes: Negative.    Cardiovascular: Positive for dyspnea on exertion (mild ). Negative for chest pain, palpitations and syncope.   Respiratory: Negative for cough and shortness of breath.    Endocrine: Negative.    Hematologic/Lymphatic: Negative.    Skin: Negative.    Musculoskeletal: Negative for falls and myalgias.   Gastrointestinal: Negative for abdominal pain and melena.   Genitourinary: Negative for dysuria and hematuria.   Neurological: Negative for dizziness.   Psychiatric/Behavioral: Negative for altered mental status and depression.   Allergic/Immunologic: Negative.       Diabetes- No  Thyroid-normal    Objective     /60 (BP Location: Left arm)   Pulse 68   Ht 182.9 cm (72.01\")   Wt 109 kg (240 lb 3.2 oz)   BMI 32.57 kg/m²     Physical Exam   Constitutional: He is oriented to person, place, and time. He appears well-developed and well-nourished. No distress.   HENT:   Head: Normocephalic.   Eyes: Pupils are equal, round, and reactive to light.   Neck: Normal range of motion.   Cardiovascular: Normal rate, regular rhythm and intact distal pulses.   Murmur heard.  Pulmonary/Chest: Effort normal and breath sounds normal. No respiratory distress. He has no wheezes.   Abdominal: Soft. Bowel sounds are normal.   Musculoskeletal: Normal " range of motion. He exhibits no edema.   Neurological: He is alert and oriented to person, place, and time.   Skin: Skin is warm and dry. He is not diaphoretic.   Psychiatric: He has a normal mood and affect.   Nursing note and vitals reviewed.     Procedures          Assessment/Plan    Heart rate and blood pressure very well controlled. Continue losartan, diltiazem.  Lipids very well controlled with Crestor and fenofibrate. Recommend low dose asa. His last cardiac work up was five years ago when he underwent bypass surgery. Recommend repeat stress test and echocardiogram to evaluate coronary perfusion, LV function, mitral valve function. He did have an episode concerning to him as described in HPI. He is now willing to undergo work up which will be scheduled. No changes made to his medications. Thank you for sending labs. He was congratulated on his weight loss efforts. Encouraged to continue the same. Limit sodium, sugars, saturated fats. Regular walking/activity as he can tolerate. Further recommendations to follow his work up. We will see him back in six months or sooner if needed.   Carrington was seen today for follow-up and med refill.    Diagnoses and all orders for this visit:    IHD (ischemic heart disease)  -     Stress Test With Myocardial Perfusion One Day; Future  -     Adult Transthoracic Echo Complete W/ Cont if Necessary Per Protocol; Future    Hypercholesteremia  -     Stress Test With Myocardial Perfusion One Day; Future  -     Adult Transthoracic Echo Complete W/ Cont if Necessary Per Protocol; Future    Essential hypertension  -     Stress Test With Myocardial Perfusion One Day; Future  -     Adult Transthoracic Echo Complete W/ Cont if Necessary Per Protocol; Future    Murmur, cardiac  -     Stress Test With Myocardial Perfusion One Day; Future  -     Adult Transthoracic Echo Complete W/ Cont if Necessary Per Protocol; Future    Metabolic syndrome  -     Stress Test With Myocardial Perfusion One  Day; Future  -     Adult Transthoracic Echo Complete W/ Cont if Necessary Per Protocol; Future    Stable angina pectoris (CMS/HCC)  -     Stress Test With Myocardial Perfusion One Day; Future  -     Adult Transthoracic Echo Complete W/ Cont if Necessary Per Protocol; Future        Patient's Body mass index is 32.57 kg/m². BMI is above normal parameters. Recommendations include: nutrition counseling.                   Electronically signed by JAMEE Leslie,  July 10, 2019 10:46 AM

## 2019-07-14 RX ORDER — LOSARTAN POTASSIUM 50 MG/1
50 TABLET ORAL 2 TIMES DAILY
Qty: 180 TABLET | Refills: 3 | Status: SHIPPED | OUTPATIENT
Start: 2019-07-14 | End: 2019-09-06 | Stop reason: SDUPTHER

## 2019-07-14 RX ORDER — FENOFIBRIC ACID 135 MG/1
135 CAPSULE, DELAYED RELEASE ORAL DAILY
Qty: 90 CAPSULE | Refills: 3 | Status: SHIPPED | OUTPATIENT
Start: 2019-07-14 | End: 2019-08-21 | Stop reason: SDUPTHER

## 2019-07-18 ENCOUNTER — HOSPITAL ENCOUNTER (OUTPATIENT)
Dept: CARDIOLOGY | Facility: HOSPITAL | Age: 69
Discharge: HOME | End: 2019-07-18

## 2019-07-18 VITALS — WEIGHT: 240.3 LBS | BODY MASS INDEX: 32.55 KG/M2 | HEIGHT: 72 IN

## 2019-07-18 DIAGNOSIS — E78.00 HYPERCHOLESTEREMIA: ICD-10-CM

## 2019-07-18 DIAGNOSIS — I20.8 STABLE ANGINA PECTORIS (HCC): ICD-10-CM

## 2019-07-18 DIAGNOSIS — I10 ESSENTIAL HYPERTENSION: ICD-10-CM

## 2019-07-18 DIAGNOSIS — E88.81 METABOLIC SYNDROME: ICD-10-CM

## 2019-07-18 DIAGNOSIS — I25.9 IHD (ISCHEMIC HEART DISEASE): ICD-10-CM

## 2019-07-18 DIAGNOSIS — R01.1 MURMUR, CARDIAC: ICD-10-CM

## 2019-07-18 PROCEDURE — 0 TECHNETIUM SESTAMIBI: Performed by: INTERNAL MEDICINE

## 2019-07-18 PROCEDURE — 93306 TTE W/DOPPLER COMPLETE: CPT

## 2019-07-18 PROCEDURE — 78452 HT MUSCLE IMAGE SPECT MULT: CPT | Performed by: INTERNAL MEDICINE

## 2019-07-18 PROCEDURE — A9500 TC99M SESTAMIBI: HCPCS | Performed by: INTERNAL MEDICINE

## 2019-07-18 PROCEDURE — 93018 CV STRESS TEST I&R ONLY: CPT | Performed by: INTERNAL MEDICINE

## 2019-07-18 PROCEDURE — 93306 TTE W/DOPPLER COMPLETE: CPT | Performed by: INTERNAL MEDICINE

## 2019-07-18 PROCEDURE — 78452 HT MUSCLE IMAGE SPECT MULT: CPT

## 2019-07-18 PROCEDURE — 93017 CV STRESS TEST TRACING ONLY: CPT

## 2019-07-18 RX ADMIN — TECHNETIUM TC 99M SESTAMIBI 1 DOSE: 1 INJECTION INTRAVENOUS at 12:26

## 2019-07-18 RX ADMIN — TECHNETIUM TC 99M SESTAMIBI 1 DOSE: 1 INJECTION INTRAVENOUS at 14:45

## 2019-07-19 LAB
BH CV ECHO MEAS - ACS: 2.3 CM
BH CV ECHO MEAS - AO MAX PG: 8.9 MMHG
BH CV ECHO MEAS - AO MEAN PG: 4.6 MMHG
BH CV ECHO MEAS - AO ROOT AREA (BSA CORRECTED): 1.4
BH CV ECHO MEAS - AO ROOT AREA: 7.7 CM^2
BH CV ECHO MEAS - AO ROOT DIAM: 3.1 CM
BH CV ECHO MEAS - AO V2 MAX: 149.1 CM/SEC
BH CV ECHO MEAS - AO V2 MEAN: 97.3 CM/SEC
BH CV ECHO MEAS - AO V2 VTI: 36.4 CM
BH CV ECHO MEAS - BSA(HAYCOCK): 2.4 M^2
BH CV ECHO MEAS - BSA: 2.3 M^2
BH CV ECHO MEAS - BZI_BMI: 32.5 KILOGRAMS/M^2
BH CV ECHO MEAS - BZI_METRIC_HEIGHT: 182.9 CM
BH CV ECHO MEAS - BZI_METRIC_WEIGHT: 108.9 KG
BH CV ECHO MEAS - EDV(CUBED): 168.4 ML
BH CV ECHO MEAS - EDV(TEICH): 148.8 ML
BH CV ECHO MEAS - EF(CUBED): 78.3 %
BH CV ECHO MEAS - EF(TEICH): 70 %
BH CV ECHO MEAS - ESV(CUBED): 36.5 ML
BH CV ECHO MEAS - ESV(TEICH): 44.7 ML
BH CV ECHO MEAS - FS: 39.9 %
BH CV ECHO MEAS - IVS/LVPW: 0.98
BH CV ECHO MEAS - IVSD: 1.1 CM
BH CV ECHO MEAS - LA DIMENSION(2D): 4.2 CM
BH CV ECHO MEAS - LA DIMENSION: 4.3 CM
BH CV ECHO MEAS - LA/AO: 1.4
BH CV ECHO MEAS - LAT PEAK E' VEL: 10 CM/SEC
BH CV ECHO MEAS - LV IVRT: 0.1 SEC
BH CV ECHO MEAS - LV MASS(C)D: 246 GRAMS
BH CV ECHO MEAS - LV MASS(C)DI: 106.9 GRAMS/M^2
BH CV ECHO MEAS - LVIDD: 5.5 CM
BH CV ECHO MEAS - LVIDS: 3.3 CM
BH CV ECHO MEAS - LVPWD: 1.1 CM
BH CV ECHO MEAS - MED PEAK E' VEL: 7 CM/SEC
BH CV ECHO MEAS - MITRAL HR: 98.5 BPM
BH CV ECHO MEAS - MITRAL R-R: 0.61 SEC
BH CV ECHO MEAS - MV A MAX VEL: 49.7 CM/SEC
BH CV ECHO MEAS - MV DEC SLOPE: 365.3 CM/SEC^2
BH CV ECHO MEAS - MV DEC TIME: 0.24 SEC
BH CV ECHO MEAS - MV E MAX VEL: 88 CM/SEC
BH CV ECHO MEAS - MV E/A: 1.8
BH CV ECHO MEAS - MV MAX PG: 2.8 MMHG
BH CV ECHO MEAS - MV MEAN PG: 1.1 MMHG
BH CV ECHO MEAS - MV V2 MAX: 83.5 CM/SEC
BH CV ECHO MEAS - MV V2 MEAN: 49.4 CM/SEC
BH CV ECHO MEAS - MV V2 VTI: 29.8 CM
BH CV ECHO MEAS - PA MAX PG (FULL): 4.9 MMHG
BH CV ECHO MEAS - PA MAX PG: 9 MMHG
BH CV ECHO MEAS - PA MEAN PG (FULL): 2.6 MMHG
BH CV ECHO MEAS - PA MEAN PG: 4.9 MMHG
BH CV ECHO MEAS - PA V2 MAX: 150.2 CM/SEC
BH CV ECHO MEAS - PA V2 MEAN: 103.3 CM/SEC
BH CV ECHO MEAS - PA V2 VTI: 40.7 CM
BH CV ECHO MEAS - PULM. HR: 150.8 BPM
BH CV ECHO MEAS - PULM. R-R: 0.4 SEC
BH CV ECHO MEAS - RAP SYSTOLE: 10 MMHG
BH CV ECHO MEAS - RV MAX PG: 4.1 MMHG
BH CV ECHO MEAS - RV MEAN PG: 2.3 MMHG
BH CV ECHO MEAS - RV V1 MAX: 101.2 CM/SEC
BH CV ECHO MEAS - RV V1 MEAN: 70.1 CM/SEC
BH CV ECHO MEAS - RV V1 VTI: 25.9 CM
BH CV ECHO MEAS - RVDD: 3.4 CM
BH CV ECHO MEAS - RVSP: 33 MMHG
BH CV ECHO MEAS - SI(AO): 122.4 ML/M^2
BH CV ECHO MEAS - SI(CUBED): 57.3 ML/M^2
BH CV ECHO MEAS - SI(TEICH): 45.2 ML/M^2
BH CV ECHO MEAS - SV(AO): 281.7 ML
BH CV ECHO MEAS - SV(CUBED): 131.9 ML
BH CV ECHO MEAS - SV(TEICH): 104.1 ML
BH CV ECHO MEAS - TR MAX VEL: 214.2 CM/SEC
BH CV ECHO MEASUREMENTS AVERAGE E/E' RATIO: 10.35
BH CV NUCLEAR PRIOR STUDY: 3
BH CV STRESS COMMENTS STAGE 1: NORMAL
BH CV STRESS DOSE REGADENOSON STAGE 1: 0.4
BH CV STRESS DURATION MIN STAGE 1: 3
BH CV STRESS DURATION SEC STAGE 1: 0
BH CV STRESS GRADE STAGE 1: 10
BH CV STRESS METS STAGE 1: 5
BH CV STRESS PROTOCOL 1: NORMAL
BH CV STRESS RECOVERY BP: NORMAL MMHG
BH CV STRESS RECOVERY HR: 72 BPM
BH CV STRESS SPEED STAGE 1: 1.7
BH CV STRESS STAGE 1: 1
LV EF NUC BP: 66 %
MAXIMAL PREDICTED HEART RATE: 154 BPM
MAXIMAL PREDICTED HEART RATE: 154 BPM
PERCENT MAX PREDICTED HR: 81.17 %
STRESS BASELINE BP: NORMAL MMHG
STRESS BASELINE HR: 53 BPM
STRESS PERCENT HR: 95 %
STRESS POST ESTIMATED WORKLOAD: 10.4 METS
STRESS POST EXERCISE DUR MIN: 9 MIN
STRESS POST PEAK BP: NORMAL MMHG
STRESS POST PEAK HR: 125 BPM
STRESS TARGET HR: 131 BPM
STRESS TARGET HR: 131 BPM

## 2019-07-22 ENCOUNTER — TELEPHONE (OUTPATIENT)
Dept: CARDIOLOGY | Facility: CLINIC | Age: 69
End: 2019-07-22

## 2019-07-22 RX ORDER — NIFEDIPINE 60 MG/1
60 TABLET, EXTENDED RELEASE ORAL DAILY
Qty: 90 TABLET | Refills: 1 | Status: SHIPPED | OUTPATIENT
Start: 2019-07-22 | End: 2019-09-04 | Stop reason: SDUPTHER

## 2019-07-22 NOTE — TELEPHONE ENCOUNTER
----- Message from JAMEE Leslie sent at 7/22/2019  8:01 AM EDT -----  Note the following from stress:    Hypertensive bp response. BP increased from 139/74 to 213/73 at peak exercise.   Dr. Painter recommends changing CCB from diltiazem to Procardia 60 mg.    Add Lopressor 25 mg BID.     Imaging showed small area of decreased blood flow to the lateral wall, possibly due to blood pressure response. Heart pump is strong.     If he has any further episodes of weakness, SOB, or chest tightness, recommend repeat cath.

## 2019-07-22 NOTE — TELEPHONE ENCOUNTER
Refer to stress test results. Patient's wife Madeline aware of recommendations. Scripts for Lopressor 25mg bid and Nifedipine XL 60mg daily sent to pharmacy.

## 2019-08-14 ENCOUNTER — TELEPHONE (OUTPATIENT)
Dept: CARDIOLOGY | Facility: CLINIC | Age: 69
End: 2019-08-14

## 2019-08-14 NOTE — TELEPHONE ENCOUNTER
[facility] in [place] called for cardiac clearance. Asking for cardiac clearance, left small finger wound debridement with tendon repair with general anesthesia. Pt had accident with saw, planning surgery tomorrow.  Confirmed that pt is not on any kind of blood thinners.      Recent stress and echo from 7/19/19:      Stress:  · Average exercise tolerance.  · Normal chronotropic response.  · Hypertensive blood pressure response.  · No chest pain noted.  · ST depression of 2 mm seen.  · Left ventricular ejection fraction is normal (Calculated EF = 66%).  · SPECT imaging shows hypoperfusion involving the lateral wall seen during the stress which does get better during rest.  · Small lateral wall ischemia noted along with hypertensive blood pressure response.     Recommendation     Change Cartia XT to Procardia XL 60 mg once a day.  Add beta-blockers in the form of Lopressor 25 mg twice daily..  If symptoms persist, need elective cardiac catheterization..          Echo:  ·   The study is technically difficult  · The left ventricular cavity is mildly dilated.  · Left ventricular wall thickness is consistent with mild concentric hypertrophy.  · Estimated EF appears to be in the range of 61 - 65%.  · Left ventricular diastolic dysfunction (grade I) consistent with impaired relaxation.  · Left atrial cavity size is mildly dilated. 4.3 Cm  · No aortic valve stenosis is present.  · Mild aortic valve regurgitation is present.  · Mild mitral valve regurgitation is present  · Mild tricuspid valve regurgitation is present. RVSP- 33 mmHg.     Fax [phone]  Phone: [phone]  Juan

## 2019-08-20 RX ORDER — FENOFIBRIC ACID 135 MG/1
CAPSULE, DELAYED RELEASE ORAL
Qty: 90 CAPSULE | Refills: 4 | OUTPATIENT
Start: 2019-08-20

## 2019-08-21 RX ORDER — FENOFIBRIC ACID 135 MG/1
135 CAPSULE, DELAYED RELEASE ORAL DAILY
Qty: 90 CAPSULE | Refills: 3 | Status: SHIPPED | OUTPATIENT
Start: 2019-08-21 | End: 2020-07-15 | Stop reason: SDUPTHER

## 2019-08-21 NOTE — TELEPHONE ENCOUNTER
Patient called and left  stating that Fenofibrate was sent to [facility] and needed to be sent to [facility] instead. Asking for 90 days supply.  [facility] per patient request

## 2019-09-04 RX ORDER — NIFEDIPINE 60 MG/1
60 TABLET, EXTENDED RELEASE ORAL DAILY
Qty: 90 TABLET | Refills: 1 | Status: SHIPPED | OUTPATIENT
Start: 2019-09-04 | End: 2020-01-06 | Stop reason: SDUPTHER

## 2019-09-06 ENCOUNTER — TELEPHONE (OUTPATIENT)
Dept: CARDIOLOGY | Facility: CLINIC | Age: 69
End: 2019-09-06

## 2019-09-06 RX ORDER — LOSARTAN POTASSIUM 50 MG/1
50 TABLET ORAL 2 TIMES DAILY
Qty: 180 TABLET | Refills: 2 | Status: SHIPPED | OUTPATIENT
Start: 2019-09-06 | End: 2020-07-15 | Stop reason: SDUPTHER

## 2019-09-06 NOTE — TELEPHONE ENCOUNTER
Patient called requesting script for Losartan to be [facility] not [facility]. Patient requesting [facility] to be removed from his pharmacy. [facility] pharmacy removed from pharmacy list. Refills on Losartan [facility]mg bid [facility]

## 2019-10-31 ENCOUNTER — TELEPHONE (OUTPATIENT)
Dept: CARDIOLOGY | Facility: CLINIC | Age: 69
End: 2019-10-31

## 2019-11-01 NOTE — TELEPHONE ENCOUNTER
Patient made aware B/P readings low normal yet ok if he can tolerate, if systolic drops below 100, reduce Losartan to once daily, patient verbalized understanding.

## 2020-01-06 ENCOUNTER — TELEPHONE (OUTPATIENT)
Dept: CARDIOLOGY | Facility: CLINIC | Age: 70
End: 2020-01-06

## 2020-01-06 RX ORDER — NIFEDIPINE 60 MG/1
60 TABLET, EXTENDED RELEASE ORAL DAILY
Qty: 90 TABLET | Refills: 0 | Status: SHIPPED | OUTPATIENT
Start: 2020-01-06 | End: 2020-01-15 | Stop reason: SDUPTHER

## 2020-01-06 NOTE — TELEPHONE ENCOUNTER
"Patient called requesting refill on procardia XL 60mg daily, patient states \"pharmacy said you all cancelled my order\". LPN explained to patient we had not cancelled his script and that according to chart he had another refill. Patient reports that he did not have another refill. 90 day refill on Procardia XL 60mg daily sent to pharmacy.  "

## 2020-01-10 NOTE — PROGRESS NOTES
Chief Complaint   Patient presents with   • Follow-up     For cardiac management. Patient is not on aspirin. Broke ankle in September.  Last lab work was done on 10/11/19 per PCP, in chart under media. Has only been taking metoprolol once a day due to HR getting down to 44 with taking 2 times a day.    • Med Refill     Needs refills on cardiac medications. 90 day supplies to [facility]. Went over medication list verbally.        Cardiac Complaints  none      Subjective   Carrington Fontaine is a 69 y.o. male with hypertension, hyperlipidemia, and IHD diagnosed in 2014 when he presented with chest pain found to have inferolateral ischemia on stress. Cardiac cath revealed multivessel disease and he underwent 4 vessel CABG.  He developed post op atrial fib and underwent BLAIR and cardioversion without success. Later, he converted on his own and remained in sinus, so Coumadin and amiodarone were stopped. He has been managed conservatively and has done very well. Last visit, he reported active lifestyle managing his property without concerns. Metoprolol was discontinued previously secondary to dizziness and he stopped amlodipine secondary to pedal edema. Last visit he reported fatigue while pressure washing. Stress and echo were advised.  Stress showed lateral ischemia with good LV function and HTN response. Lopressor was added at 25mg BID and cartia advised to be changed to procardia.     He returns today for follow up and reports doing well. Cardiac symptoms denied. He does admit to an ankle break in the fall and had surgery.  He is ambulating with crutches. He has been taking his beta blocker once daily as twice daily was causing bradycardia. Labs remain followed by PCP and were checked in October. Most recent FLP showed lipids managed with TRIG 90, HDL 40, LDL 52.  Cardiac refills requested for 90 day supply.        Cardiac History  Past Surgical History:   Procedure Laterality Date   • CARDIAC CATHETERIZATION   09/14/2014    80% LAD, 70% D1 &D2, 80% OM, 75% RCA.   • CARDIOVASCULAR STRESS TEST  08/29/2014    9min, 152/74, inferiolateral ischemia, Imdur, cath   • CARDIOVASCULAR STRESS TEST  07/18/2019    9 Min. 10.40 METS. 81% THR. BP- 213/73. 2 mm ST depression. EF 66%. Lateral Ischemia.   • CORONARY ARTERY BYPASS GRAFT  09/22/2014    CABG-() LIMA-LAD, SVG-OM, SVG-PDA, SVG-RCA.   • ECHO - CONVERTED  08/29/2014    Echo-EF 65%   • ECHO - CONVERTED  07/18/2019    EF 61-65%, mild MR, RSVP 33mmHg   • TRANSESOPHAGEAL ECHOCARDIOGRAM (BLAIR) AND CARDIOVERSION  09/25/2014    @ [facility]- EF 50%, no mass, unsuccessful cardioversion x 4.       Current Outpatient Medications   Medication Sig Dispense Refill   • ALLERGY SERUM INJECTION Inject  under the skin Every 21 (Twenty-One) Days.     • fenofibric acid (TRILIPIX) 135 MG capsule delayed-release delayed release capsule Take 1 capsule by mouth Daily. 90 capsule 3   • FLUoxetine (PROzac) 20 MG capsule Take 20 mg by mouth 2 (Two) Times a Day.     • fluticasone (FLONASE) 50 MCG/ACT nasal spray 2 sprays into the nostril(s) as directed by provider Daily.     • ibuprofen (ADVIL,MOTRIN) 800 MG tablet Take 800 mg by mouth 3 (Three) Times a Day As Needed for Mild Pain (1-3).     • levothyroxine (SYNTHROID, LEVOTHROID) 137 MCG tablet Take 137 mcg by mouth Daily.     • losartan (COZAAR) 50 MG tablet Take 1 tablet by mouth 2 (Two) Times a Day. 180 tablet 2   • metoprolol tartrate (LOPRESSOR) 25 MG tablet Take 1 tablet by mouth 2 (Two) Times a Day. 180 tablet 1   • NIFEdipine XL (PROCARDIA XL) 60 MG 24 hr tablet Take 1 tablet by mouth Daily. 90 tablet 2   • omeprazole (priLOSEC) 20 MG capsule Take 40 mg by mouth Daily.     • rosuvastatin (CRESTOR) 10 MG tablet Take 10 mg by mouth Daily.     • tadalafil (CIALIS) 5 MG tablet Take 5 mg by mouth Daily.       No current facility-administered medications for this visit.        Lisinopril and Penicillins    Past Medical History:   Diagnosis Date   •  "Arthritis    • Coronary artery disease    • Cyst of mouth     removed    • H/O colonoscopy    • H/O right knee surgery    • Hypertension        Social History     Socioeconomic History   • Marital status:      Spouse name: Not on file   • Number of children: Not on file   • Years of education: Not on file   • Highest education level: Not on file   Tobacco Use   • Smoking status: Former Smoker     Last attempt to quit:      Years since quittin.0   • Smokeless tobacco: Never Used   • Tobacco comment: history of smoking x 5 years, quit at 22 years old..    Substance and Sexual Activity   • Alcohol use: No     Frequency: Never     Comment: rarely   • Drug use: No       Family History   Problem Relation Age of Onset   • Cancer Mother    • Diabetes Mother    • Melanoma Father        Review of Systems   Constitution: Negative for malaise/fatigue and night sweats.   Cardiovascular: Negative for chest pain, claudication, dyspnea on exertion, irregular heartbeat, leg swelling, near-syncope, orthopnea, palpitations and syncope.   Respiratory: Negative for cough, shortness of breath and wheezing.    Musculoskeletal: Positive for joint pain and stiffness.   Gastrointestinal: Negative for heartburn, nausea and vomiting.   Genitourinary: Negative for dysuria, hematuria, hesitancy and nocturia.   Neurological: Negative for dizziness, light-headedness and loss of balance.   Psychiatric/Behavioral: Negative for depression and memory loss. The patient is not nervous/anxious.            Objective     /80 (BP Location: Left arm)   Pulse 60   Ht 182 cm (71.65\")   Wt 103 kg (228 lb)   BMI 31.22 kg/m²     Physical Exam   Constitutional: He is oriented to person, place, and time. He appears well-developed and well-nourished.   HENT:   Head: Normocephalic and atraumatic.   Eyes: Pupils are equal, round, and reactive to light. EOM are normal.   Neck: Normal range of motion. Neck supple.   Cardiovascular: Normal rate " and regular rhythm.   Murmur heard.  Pulmonary/Chest: Effort normal and breath sounds normal.   Abdominal: Soft.   Musculoskeletal: Normal range of motion. He exhibits tenderness.   Ankle in a boot.   Neurological: He is alert and oriented to person, place, and time.   Skin: Skin is warm and dry.   Psychiatric: He has a normal mood and affect.       Procedures    Assessment/Plan     HTN:  Blood pressure well managed on current. Procardia continued at current. He has been only been taking beta blocker once daily as his heart rate was too slow.  Blood pressure has been well managed.    Hyperlipidemia:  Well managed with crestor and fenofibrate, current continued.  Thank you for most recent FLP which showed lipids well managed.    IHD:  Status stable. No concerns voiced. ASA continued.  Most recent stress test from summer 2019 showed no ischemic burden.    BMI noted at 31 with weight loss noted.  He has been watching hid diet closer and is limiting portion size.  He was praised in regards.    He is following with ortho in [place] for right broken ankle and still ambulating with cane.      Refills of procardia sent per request.    6 month follow up recommended or sooner if needed.          Problems Addressed this Visit        Cardiovascular and Mediastinum    IHD (ischemic heart disease) - Primary    Relevant Medications    NIFEdipine XL (PROCARDIA XL) 60 MG 24 hr tablet    Essential hypertension    Relevant Medications    NIFEdipine XL (PROCARDIA XL) 60 MG 24 hr tablet    Hypercholesteremia    Murmur, cardiac      Other Visit Diagnoses     Broken ankle, right, closed, with delayed healing, subsequent encounter        Obesity (BMI 30.0-34.9)              Patient's Body mass index is 31.22 kg/m². BMI is above normal parameters. Recommendations include: nutrition counseling.                Electronically signed by JAMEE Fish January 15, 2020 5:46 PM

## 2020-01-15 ENCOUNTER — OFFICE VISIT (OUTPATIENT)
Dept: CARDIOLOGY | Facility: CLINIC | Age: 70
End: 2020-01-15

## 2020-01-15 VITALS
BODY MASS INDEX: 30.88 KG/M2 | HEIGHT: 72 IN | HEART RATE: 60 BPM | SYSTOLIC BLOOD PRESSURE: 120 MMHG | DIASTOLIC BLOOD PRESSURE: 80 MMHG | WEIGHT: 228 LBS

## 2020-01-15 DIAGNOSIS — E66.9 OBESITY (BMI 30.0-34.9): ICD-10-CM

## 2020-01-15 DIAGNOSIS — S82.891G: ICD-10-CM

## 2020-01-15 DIAGNOSIS — I10 ESSENTIAL HYPERTENSION: ICD-10-CM

## 2020-01-15 DIAGNOSIS — I25.9 IHD (ISCHEMIC HEART DISEASE): Primary | ICD-10-CM

## 2020-01-15 DIAGNOSIS — E78.00 HYPERCHOLESTEREMIA: ICD-10-CM

## 2020-01-15 DIAGNOSIS — R01.1 MURMUR, CARDIAC: ICD-10-CM

## 2020-01-15 PROCEDURE — 99214 OFFICE O/P EST MOD 30 MIN: CPT | Performed by: NURSE PRACTITIONER

## 2020-01-15 RX ORDER — NIFEDIPINE 60 MG/1
60 TABLET, EXTENDED RELEASE ORAL DAILY
Qty: 90 TABLET | Refills: 2 | Status: SHIPPED | OUTPATIENT
Start: 2020-01-15 | End: 2020-03-23 | Stop reason: SDUPTHER

## 2020-03-23 ENCOUNTER — TELEPHONE (OUTPATIENT)
Dept: CARDIOLOGY | Facility: CLINIC | Age: 70
End: 2020-03-23

## 2020-03-23 RX ORDER — NIFEDIPINE 60 MG/1
TABLET, EXTENDED RELEASE ORAL
Qty: 90 TABLET | Refills: 3 | OUTPATIENT
Start: 2020-03-23

## 2020-03-23 RX ORDER — NIFEDIPINE 60 MG/1
60 TABLET, EXTENDED RELEASE ORAL DAILY
Qty: 90 TABLET | Refills: 3 | Status: SHIPPED | OUTPATIENT
Start: 2020-03-23 | End: 2021-03-08

## 2020-03-23 NOTE — TELEPHONE ENCOUNTER
Pt called demanding that we send in another script to [facility] for his Nifedipine XL 60 mg daily. He said on line it is showing no refills. Advised him that in January there was 90 days with 2 refills sent at that time. He insisted that he didn't want to call [facility] to send in another script. Another script sent. Advised pt that really he needed to call [facility] to clarify the problem. I sent another script.

## 2020-05-19 ENCOUNTER — TELEPHONE (OUTPATIENT)
Dept: CARDIOLOGY | Facility: CLINIC | Age: 70
End: 2020-05-19

## 2020-05-19 NOTE — TELEPHONE ENCOUNTER
([facility])-Dr. Shaw requesting cardiac clearance for removing hardware/debridement and bone graft to right ankle/foot.      7/18/2019-Stress-9 Min. 10.40 METS. 81% THR. BP- 213/73. 2 mm ST depression. EF 66%. Lateral Ischemia.    2014-CABG-() LIMA-LAD, SVG-OM, SVG-PDA, SVG-RCA.            Fax [phone], [place]

## 2020-05-20 NOTE — TELEPHONE ENCOUNTER
"Request to hold ASA is not on the clearance request letter.    When I discussed with patient, and asked him if the surgeon has advised to hold ASA or not, he asked me, \"what does the letter state?\" \"Does it ask to hold ASA?\"  I said no it does not.  Then he said, \"just send the clearance letter.\"   "

## 2020-07-15 ENCOUNTER — OFFICE VISIT (OUTPATIENT)
Dept: CARDIOLOGY | Facility: CLINIC | Age: 70
End: 2020-07-15

## 2020-07-15 VITALS
DIASTOLIC BLOOD PRESSURE: 60 MMHG | WEIGHT: 248.8 LBS | SYSTOLIC BLOOD PRESSURE: 110 MMHG | TEMPERATURE: 98.7 F | HEIGHT: 72 IN | HEART RATE: 56 BPM | BODY MASS INDEX: 33.7 KG/M2

## 2020-07-15 DIAGNOSIS — E88.81 METABOLIC SYNDROME: ICD-10-CM

## 2020-07-15 DIAGNOSIS — I25.9 IHD (ISCHEMIC HEART DISEASE): ICD-10-CM

## 2020-07-15 DIAGNOSIS — I10 ESSENTIAL HYPERTENSION: Primary | ICD-10-CM

## 2020-07-15 DIAGNOSIS — E78.00 HYPERCHOLESTEREMIA: ICD-10-CM

## 2020-07-15 PROCEDURE — 99213 OFFICE O/P EST LOW 20 MIN: CPT | Performed by: NURSE PRACTITIONER

## 2020-07-15 RX ORDER — FENOFIBRIC ACID 135 MG/1
135 CAPSULE, DELAYED RELEASE ORAL DAILY
Qty: 90 CAPSULE | Refills: 3 | Status: SHIPPED | OUTPATIENT
Start: 2020-07-15 | End: 2021-06-21 | Stop reason: SDUPTHER

## 2020-07-15 RX ORDER — LOSARTAN POTASSIUM 50 MG/1
50 TABLET ORAL 2 TIMES DAILY
Qty: 180 TABLET | Refills: 3 | Status: SHIPPED | OUTPATIENT
Start: 2020-07-15 | End: 2021-06-21 | Stop reason: SDUPTHER

## 2020-07-15 RX ORDER — HYDROCODONE BITARTRATE AND ACETAMINOPHEN 10; 325 MG/1; MG/1
1 TABLET ORAL 3 TIMES DAILY PRN
COMMUNITY
End: 2020-12-23

## 2020-07-15 NOTE — PROGRESS NOTES
Chief Complaint   Patient presents with   • Follow-up     Cardiac management.   • Lab     Last labs 3-4 weeks ago at [facility]. Had ankle surgery 6/2020 x2.   • Med Refill     Needs refills on Trilipix, Lopressor, and Losartan-90 day.     Subjective       Carrington Fontaine is a 69 y.o. male with hypertension, hyperlipidemia, and IHD diagnosed in 2014 when he presented with chest pain found to have inferolateral ischemia on stress. Cardiac cath revealed multivessel disease and he underwent 4 vessel CABG.  He developed post op atrial fib and underwent BLAIR and cardioversion without success. Later, he converted on his own and remained in sinus, so Coumadin and amiodarone were stopped. He has been managed conservatively and has done very well. Repeat stress test 7/2019 showed excellent effort tolerance, lateral ischemia, hypertensive bp response, normal LV function. Lopressor added back at 25mg BID and Cartia advised to be changed to Procardia. He came in today for regular follow up. He continues to wear a boot on right foot secondary to 2 ankle surgeries. He continues to have some difficulty with pain, avoiding weight bearing and using a crutch. From a cardiac standpoint, he has done very well. No CP, SOB, palpitations, dizziness.      HPI         Cardiac History:    Past Surgical History:   Procedure Laterality Date   • CARDIAC CATHETERIZATION  09/14/2014    80% LAD, 70% D1 &D2, 80% OM, 75% RCA.   • CARDIOVASCULAR STRESS TEST  08/29/2014    9min, 152/74, inferiolateral ischemia, Imdur, cath   • CARDIOVASCULAR STRESS TEST  07/18/2019    9 Min. 10.40 METS. 81% THR. BP- 213/73. 2 mm ST depression. EF 66%. Lateral Ischemia.   • CORONARY ARTERY BYPASS GRAFT  09/22/2014    CABG-() LIMA-LAD, SVG-OM, SVG-PDA, SVG-RCA.   • ECHO - CONVERTED  08/29/2014    Echo-EF 65%   • ECHO - CONVERTED  07/18/2019    EF 61-65%, mild MR, RSVP 33mmHg   • TRANSESOPHAGEAL ECHOCARDIOGRAM (BLAIR) AND CARDIOVERSION  09/25/2014    @  [facility]- EF 50%, no mass, unsuccessful cardioversion x 4.       Current Outpatient Medications   Medication Sig Dispense Refill   • fenofibric acid (TRILIPIX) 135 MG capsule delayed-release delayed release capsule Take 1 capsule by mouth Daily. 90 capsule 3   • FLUoxetine (PROzac) 20 MG capsule Take 20 mg by mouth 2 (Two) Times a Day.     • HYDROcodone-acetaminophen (NORCO)  MG per tablet Take 1 tablet by mouth 3 (Three) Times a Day As Needed for Moderate Pain .     • ibuprofen (ADVIL,MOTRIN) 800 MG tablet Take 800 mg by mouth 3 (Three) Times a Day As Needed for Mild Pain (1-3).     • levothyroxine (SYNTHROID, LEVOTHROID) 137 MCG tablet Take 137 mcg by mouth Daily.     • losartan (COZAAR) 50 MG tablet Take 1 tablet by mouth 2 (Two) Times a Day. 180 tablet 3   • metoprolol tartrate (LOPRESSOR) 25 MG tablet Take 1 tablet by mouth 2 (Two) Times a Day. 180 tablet 3   • NIFEdipine XL (PROCARDIA XL) 60 MG 24 hr tablet Take 1 tablet by mouth Daily. 90 tablet 3   • omeprazole (priLOSEC) 20 MG capsule Take 40 mg by mouth Daily.     • rosuvastatin (CRESTOR) 10 MG tablet Take 10 mg by mouth Daily.     • tadalafil (CIALIS) 5 MG tablet Take 5 mg by mouth Daily.       No current facility-administered medications for this visit.      Lisinopril and Penicillins    Past Medical History:   Diagnosis Date   • Arthritis    • Coronary artery disease    • Cyst of mouth     removed    • H/O colonoscopy    • H/O right knee surgery    • History of ankle surgery 06/2020    x2   • Hypertension      Social History     Socioeconomic History   • Marital status:      Spouse name: Not on file   • Number of children: Not on file   • Years of education: Not on file   • Highest education level: Not on file   Tobacco Use   • Smoking status: Former Smoker     Last attempt to quit: 1973     Years since quittin.5   • Smokeless tobacco: Never Used   • Tobacco comment: history of smoking x 5 years, quit at 22 years old..    Substance and  "Sexual Activity   • Alcohol use: No     Frequency: Never     Comment: rarely   • Drug use: No     Family History   Problem Relation Age of Onset   • Cancer Mother    • Diabetes Mother    • Melanoma Father      Review of Systems   Constitution: Positive for weight gain (up 20- wearing cast/boot). Negative for decreased appetite.   HENT: Negative.    Eyes: Negative for blurred vision.   Cardiovascular: Negative for chest pain, dyspnea on exertion, leg swelling and palpitations.   Respiratory: Negative for cough.    Endocrine: Negative.    Hematologic/Lymphatic: Negative.    Skin: Negative.    Musculoskeletal: Positive for joint pain.   Gastrointestinal: Negative for abdominal pain and melena.   Genitourinary: Negative for hematuria.   Neurological: Negative for dizziness.   Psychiatric/Behavioral: Negative for altered mental status.   Allergic/Immunologic: Negative.       Diabetes- No  Thyroid-normal    Objective     /60 (BP Location: Left arm)   Pulse 56   Temp 98.7 °F (37.1 °C)   Ht 182 cm (71.65\")   Wt 113 kg (248 lb 12.8 oz)   BMI 34.07 kg/m²     Physical Exam   Constitutional: He is oriented to person, place, and time. He appears well-developed and well-nourished. No distress.   HENT:   Head: Normocephalic.   Eyes: Pupils are equal, round, and reactive to light.   Neck: Normal range of motion.   Cardiovascular: Normal rate and regular rhythm.   Pulmonary/Chest: Effort normal and breath sounds normal. No respiratory distress. He has no wheezes. He has no rales.   Abdominal: Soft. Bowel sounds are normal.   Musculoskeletal: Normal range of motion. He exhibits no edema.   R foot- boot    Neurological: He is alert and oriented to person, place, and time.   Skin: Skin is warm and dry. He is not diaphoretic.   Psychiatric: He has a normal mood and affect.      Procedures          Assessment/Plan      Carrington was seen today for follow-up, lab and med refill.    Diagnoses and all orders for this " visit:    Essential hypertension  -     losartan (COZAAR) 50 MG tablet; Take 1 tablet by mouth 2 (Two) Times a Day.  -     metoprolol tartrate (LOPRESSOR) 25 MG tablet; Take 1 tablet by mouth 2 (Two) Times a Day.  -     CBC (No Diff); Future  -     TSH; Future  -     CBC (No Diff)  -     TSH    Hypercholesteremia  -     fenofibric acid (TRILIPIX) 135 MG capsule delayed-release delayed release capsule; Take 1 capsule by mouth Daily.  -     CBC (No Diff); Future  -     Lipid Panel; Future  -     CBC (No Diff)  -     Lipid Panel    IHD (ischemic heart disease)  -     metoprolol tartrate (LOPRESSOR) 25 MG tablet; Take 1 tablet by mouth 2 (Two) Times a Day.  -     CBC (No Diff); Future  -     Comprehensive Metabolic Panel; Future  -     CBC (No Diff)  -     Comprehensive Metabolic Panel    Metabolic syndrome  -     CBC (No Diff); Future  -     CBC (No Diff)    1. CAD- s/p CABG, 2014. Repeat stress 7/2019 showed good effort tolerance, hypertensive response, and lateral ischemia. Medical management recommended. He remains asymptomatic. Continue current plan. Continue aspirin, bb, statin.     2. HTN- well controlled at 110/60. Continue losartan, metoprolol, Procardia. Limit Na. Weight loss.     3. Hypercholesterolemia- well controlled with fenofibrate and statin. LDL 52/HDL 40/Tri 89. Will repeat labs.     He appears stable from a cardiac standpoint. Will recheck his labs. Most recent stress reviewed with him. He has no new or worsening cardiac symptoms. Continue medical management.     Patient's Body mass index is 34.07 kg/m². BMI is above normal parameters. Recommendations include: nutrition counseling.               Electronically signed by JAMEE Leslie,  July 18, 2020 11:03

## 2020-12-23 ENCOUNTER — OFFICE VISIT (OUTPATIENT)
Dept: CARDIOLOGY | Facility: CLINIC | Age: 70
End: 2020-12-23

## 2020-12-23 VITALS
TEMPERATURE: 97.7 F | HEART RATE: 52 BPM | DIASTOLIC BLOOD PRESSURE: 70 MMHG | BODY MASS INDEX: 34.92 KG/M2 | SYSTOLIC BLOOD PRESSURE: 136 MMHG | WEIGHT: 257.8 LBS | HEIGHT: 72 IN

## 2020-12-23 DIAGNOSIS — I10 ESSENTIAL HYPERTENSION: ICD-10-CM

## 2020-12-23 DIAGNOSIS — E03.9 HYPOTHYROIDISM (ACQUIRED): ICD-10-CM

## 2020-12-23 DIAGNOSIS — I25.9 IHD (ISCHEMIC HEART DISEASE): Primary | ICD-10-CM

## 2020-12-23 DIAGNOSIS — E78.00 HYPERCHOLESTEREMIA: ICD-10-CM

## 2020-12-23 DIAGNOSIS — G47.30 SLEEP APNEA IN ADULT: ICD-10-CM

## 2020-12-23 PROCEDURE — 99214 OFFICE O/P EST MOD 30 MIN: CPT | Performed by: NURSE PRACTITIONER

## 2020-12-23 RX ORDER — ASPIRIN 81 MG/1
81 TABLET ORAL NIGHTLY
COMMUNITY

## 2020-12-23 NOTE — PROGRESS NOTES
Chief Complaint   Patient presents with   • Follow-up     Cardiac management.   • Lab     Last labs in chart. Does not need refills at this time.     Subjective       Carrington Fontaine is a 70 y.o. male with hypertension, hyperlipidemia, and IHD diagnosed in 2014 when he presented with chest pain found to have inferolateral ischemia on stress. Cardiac cath revealed multivessel disease and he underwent 4 vessel CABG.  He developed post op atrial fib and underwent BLAIR and cardioversion without success. Later, he converted on his own and remained in sinus, so Coumadin and amiodarone were stopped. He has been managed conservatively and has done very well. Repeat stress test 7/2019 showed excellent effort tolerance, lateral ischemia, hypertensive bp response, normal LV function. Lopressor added back at 25mg BID and Cartia advised to be changed to Procardia.     He came in today for regular follow up. He is out of his right foot boot, wearing a support brace now. He has no cardiac symptoms, no CP, SOB, palpitations. He does admit to depression and stress over his physical limitations and happenings in the news and [place] the country. Labs July 2020 showed lipids very well controlled LDL 52, HDL 41, Tri 98, . CBC, CMP stable, TSH 5.12, rechecked and returned to normal. Refills not needed at this time.           Cardiac History:    Past Surgical History:   Procedure Laterality Date   • CARDIAC CATHETERIZATION  09/14/2014    80% LAD, 70% D1 &D2, 80% OM, 75% RCA.   • CARDIOVASCULAR STRESS TEST  08/29/2014    9min, 152/74, inferiolateral ischemia, Imdur, cath   • CARDIOVASCULAR STRESS TEST  07/18/2019    9 Min. 10.40 METS. 81% THR. BP- 213/73. 2 mm ST depression. EF 66%. Lateral Ischemia.   • CORONARY ARTERY BYPASS GRAFT  09/22/2014    CABG-() LIMA-LAD, SVG-OM, SVG-PDA, SVG-RCA.   • ECHO - CONVERTED  08/29/2014    Echo-EF 65%   • ECHO - CONVERTED  07/18/2019    EF 61-65%, mild MR, RSVP 33mmHg   • TRANSESOPHAGEAL  ECHOCARDIOGRAM (BLAIR) AND CARDIOVERSION  2014    @ [facility]- EF 50%, no mass, unsuccessful cardioversion x 4.     Current Outpatient Medications   Medication Sig Dispense Refill   • aspirin 81 MG EC tablet Take 81 mg by mouth Every Night.     • fenofibric acid (TRILIPIX) 135 MG capsule delayed-release delayed release capsule Take 1 capsule by mouth Daily. 90 capsule 3   • FLUoxetine (PROzac) 20 MG capsule Take 20 mg by mouth 2 (Two) Times a Day.     • ibuprofen (ADVIL,MOTRIN) 800 MG tablet Take 800 mg by mouth 3 (Three) Times a Day As Needed for Mild Pain (1-3).     • levothyroxine (SYNTHROID, LEVOTHROID) 137 MCG tablet Take 137 mcg by mouth Daily.     • losartan (COZAAR) 50 MG tablet Take 1 tablet by mouth 2 (Two) Times a Day. 180 tablet 3   • metoprolol tartrate (LOPRESSOR) 25 MG tablet Take 1 tablet by mouth 2 (Two) Times a Day. 180 tablet 3   • NIFEdipine XL (PROCARDIA XL) 60 MG 24 hr tablet Take 1 tablet by mouth Daily. 90 tablet 3   • omeprazole (priLOSEC) 20 MG capsule Take 40 mg by mouth Daily.     • rosuvastatin (CRESTOR) 10 MG tablet Take 10 mg by mouth Daily.     • tadalafil (CIALIS) 5 MG tablet Take 5 mg by mouth Daily.       No current facility-administered medications for this visit.      Lisinopril and Penicillins    Past Medical History:   Diagnosis Date   • Arthritis    • Coronary artery disease    • Cyst of mouth     removed    • H/O colonoscopy    • H/O right knee surgery    • History of ankle surgery 06/2020    x2   • Hypertension      Social History     Socioeconomic History   • Marital status:      Spouse name: Not on file   • Number of children: Not on file   • Years of education: Not on file   • Highest education level: Not on file   Tobacco Use   • Smoking status: Former Smoker     Quit date:      Years since quittin.0   • Smokeless tobacco: Never Used   • Tobacco comment: history of smoking x 5 years, quit at 22 years old..    Substance and Sexual Activity   • Alcohol use:  "No     Frequency: Never     Comment: rarely   • Drug use: No     Family History   Problem Relation Age of Onset   • Cancer Mother    • Diabetes Mother    • Melanoma Father      Review of Systems   Constitution: Negative for decreased appetite and malaise/fatigue.   HENT: Negative.    Eyes: Negative for blurred vision.   Cardiovascular: Negative for chest pain, dyspnea on exertion, leg swelling, palpitations and syncope.   Respiratory: Negative for shortness of breath and sleep disturbances due to breathing.    Endocrine: Negative.    Hematologic/Lymphatic: Negative for bleeding problem. Does not bruise/bleed easily.   Skin: Negative.    Musculoskeletal: Negative for falls and myalgias.   Gastrointestinal: Negative for abdominal pain, heartburn and melena.   Genitourinary: Negative for hematuria.   Neurological: Negative for dizziness and light-headedness.   Psychiatric/Behavioral: Negative for altered mental status.   Allergic/Immunologic: Negative.       Objective     /70 (BP Location: Right arm)   Pulse 52   Temp 97.7 °F (36.5 °C)   Ht 182 cm (71.65\")   Wt 117 kg (257 lb 12.8 oz)   BMI 35.30 kg/m²     Vitals signs and nursing note reviewed.   Constitutional:       General: Not in acute distress.     Appearance: Well-developed. Not diaphoretic.   Eyes:      Pupils: Pupils are equal, round, and reactive to light.   HENT:      Head: Normocephalic.   Neck:      Musculoskeletal: Normal range of motion.   Pulmonary:      Effort: Pulmonary effort is normal. No respiratory distress.      Breath sounds: Normal breath sounds.   Cardiovascular:      Normal rate. Regular rhythm.   Pulses:     Intact distal pulses.   Edema:     Peripheral edema absent.   Abdominal:      General: Bowel sounds are normal.      Palpations: Abdomen is soft.   Musculoskeletal: Normal range of motion.   Skin:     General: Skin is warm and dry.   Neurological:      Mental Status: Alert and oriented to person, place, and time.      "   Procedures          Problem List Items Addressed This Visit        Cardiovascular and Mediastinum    IHD (ischemic heart disease) - Primary    Relevant Orders    CBC (No Diff)    TSH    Lipid Panel    Essential hypertension    Relevant Orders    Comprehensive Metabolic Panel    TSH    Hypercholesteremia    Relevant Orders    CBC (No Diff)    Comprehensive Metabolic Panel    Lipid Panel       Respiratory    Sleep apnea in adult    Relevant Orders    CBC (No Diff)    Comprehensive Metabolic Panel      Other Visit Diagnoses     Hypothyroidism (acquired)        Relevant Orders    TSH         1. HTN- well controlled. Continue losartan, metoprolol, nifedipine. Limit Na. He has gained some weight due to limited activity. He is going to try following stricter diet for modest weight loss.     2. CAD- s/p CABG without anginal symptoms. He is managed appropriately with medications with aspirin, statin, beta blocker. Stress test in 2019 showed lateral wall ischemia managed medically. Will continue to monitor.     3. PAF- brief post op PAF, has maintained sinus for several years now. Continue aspirin and beta blocker.     4. Hypercholesterolemia- excellent lipid control with Crestor and fenofibrate. Continue the same. Lab order given for repeat labs.     5. ZEINAB- managed with CPAP.      Patient's Body mass index is 35.3 kg/m². BMI is above normal parameters. Recommendations include: nutrition counseling.                 Electronically signed by JAMEE Leslie,  December 24, 2020 09:27 EST

## 2021-01-04 DIAGNOSIS — I25.9 IHD (ISCHEMIC HEART DISEASE): ICD-10-CM

## 2021-01-04 DIAGNOSIS — I10 ESSENTIAL HYPERTENSION: ICD-10-CM

## 2021-01-04 DIAGNOSIS — E03.9 HYPOTHYROIDISM (ACQUIRED): ICD-10-CM

## 2021-01-04 DIAGNOSIS — E78.00 HYPERCHOLESTEREMIA: ICD-10-CM

## 2021-01-04 DIAGNOSIS — G47.30 SLEEP APNEA IN ADULT: ICD-10-CM

## 2021-03-08 RX ORDER — NIFEDIPINE 60 MG/1
TABLET, EXTENDED RELEASE ORAL
Qty: 90 TABLET | Refills: 3 | Status: SHIPPED | OUTPATIENT
Start: 2021-03-08 | End: 2022-03-03

## 2021-06-21 ENCOUNTER — OFFICE VISIT (OUTPATIENT)
Dept: CARDIOLOGY | Facility: CLINIC | Age: 71
End: 2021-06-21

## 2021-06-21 VITALS
BODY MASS INDEX: 34.57 KG/M2 | WEIGHT: 255.2 LBS | HEART RATE: 56 BPM | HEIGHT: 72 IN | DIASTOLIC BLOOD PRESSURE: 58 MMHG | SYSTOLIC BLOOD PRESSURE: 110 MMHG

## 2021-06-21 DIAGNOSIS — E03.9 HYPOTHYROIDISM (ACQUIRED): ICD-10-CM

## 2021-06-21 DIAGNOSIS — I10 ESSENTIAL HYPERTENSION: ICD-10-CM

## 2021-06-21 DIAGNOSIS — E78.00 HYPERCHOLESTEREMIA: ICD-10-CM

## 2021-06-21 DIAGNOSIS — I25.9 IHD (ISCHEMIC HEART DISEASE): Primary | ICD-10-CM

## 2021-06-21 PROCEDURE — 99213 OFFICE O/P EST LOW 20 MIN: CPT | Performed by: NURSE PRACTITIONER

## 2021-06-21 RX ORDER — ROSUVASTATIN CALCIUM 10 MG/1
10 TABLET, COATED ORAL DAILY
Qty: 90 TABLET | Refills: 3 | Status: SHIPPED | OUTPATIENT
Start: 2021-06-21 | End: 2022-08-10 | Stop reason: SDUPTHER

## 2021-06-21 RX ORDER — FENOFIBRIC ACID 135 MG/1
135 CAPSULE, DELAYED RELEASE ORAL DAILY
Qty: 90 CAPSULE | Refills: 3 | Status: SHIPPED | OUTPATIENT
Start: 2021-06-21 | End: 2022-07-21

## 2021-06-21 RX ORDER — LOSARTAN POTASSIUM 50 MG/1
50 TABLET ORAL 2 TIMES DAILY
Qty: 180 TABLET | Refills: 3 | Status: SHIPPED | OUTPATIENT
Start: 2021-06-21 | End: 2022-07-21

## 2021-06-21 NOTE — PROGRESS NOTES
Chief Complaint   Patient presents with   • Follow-up     Cardiac management   • Lab     Last labs in chart. To have cataract extraction on left eye on Wednesday.   • Med Refill     Needs refills on cardiac medications except Procardia-90 day.     Subjective       Carrington Fontaine is a 70 y.o. male with hypertension, hyperlipidemia, and IHD diagnosed in 2014 when he presented with chest pain found to have inferolateral ischemia on stress. Cardiac cath revealed multivessel disease and he underwent 4 vessel CABG.  He developed post op atrial fib and underwent BLAIR and cardioversion without success. Later, he converted on his own and remained in sinus, so Coumadin and amiodarone were stopped. He has been managed conservatively and has done very well. Repeat stress test 7/2019 showed excellent effort tolerance, lateral ischemia, hypertensive bp response, normal LV function. Lopressor added back at 25mg BID and Cartia advised to be changed to Procardia.     He returns today for regular follow-up.  He has done very well recently.  Denies new or worsening cardiac symptoms.  No chest pain, shortness of breath, palpitations.  He remains fairly active with indoor outdoor responsibilities.  He had cataract removed on Wednesday, tolerated well.  Blood pressures been well controlled.  He continues to have stiffness and limited range of motion of the right ankle after multiple surgeries.  Labs January 2021 showed lipids very well controlled LDL/HDL 49/46.       Cardiac History:    Past Surgical History:   Procedure Laterality Date   • CARDIAC CATHETERIZATION  09/14/2014    80% LAD, 70% D1 &D2, 80% OM, 75% RCA.   • CARDIOVASCULAR STRESS TEST  08/29/2014    9min, 152/74, inferiolateral ischemia, Imdur, cath   • CARDIOVASCULAR STRESS TEST  07/18/2019    9 Min. 10.40 METS. 81% THR. BP- 213/73. 2 mm ST depression. EF 66%. Lateral Ischemia.   • CORONARY ARTERY BYPASS GRAFT  09/22/2014    CABG-() LIMA-LAD, SVG-OM, SVG-PDA,  SVG-RCA.   • ECHO - CONVERTED  2014    Echo-EF 65%   • ECHO - CONVERTED  2019    EF 61-65%, mild MR, RSVP 33mmHg   • TRANSESOPHAGEAL ECHOCARDIOGRAM (BLAIR) AND CARDIOVERSION  2014    @ [facility]- EF 50%, no mass, unsuccessful cardioversion x 4.     Current Outpatient Medications   Medication Sig Dispense Refill   • aspirin 81 MG EC tablet Take 81 mg by mouth Every Night.     • fenofibric acid (TRILIPIX) 135 MG capsule delayed-release delayed release capsule Take 1 capsule by mouth Daily. 90 capsule 3   • FLUoxetine (PROzac) 20 MG capsule Take 20 mg by mouth 2 (Two) Times a Day.     • ibuprofen (ADVIL,MOTRIN) 800 MG tablet Take 800 mg by mouth Daily.     • levothyroxine (SYNTHROID, LEVOTHROID) 137 MCG tablet Take 137 mcg by mouth Daily.     • losartan (COZAAR) 50 MG tablet Take 1 tablet by mouth 2 (Two) Times a Day. 180 tablet 3   • metoprolol tartrate (LOPRESSOR) 25 MG tablet Take 1 tablet by mouth 2 (Two) Times a Day. 180 tablet 3   • NIFEdipine XL (PROCARDIA XL) 60 MG 24 hr tablet TAKE 1 TABLET DAILY 90 tablet 3   • omeprazole (priLOSEC) 20 MG capsule Take 40 mg by mouth Daily.     • rosuvastatin (CRESTOR) 10 MG tablet Take 1 tablet by mouth Daily. 90 tablet 3   • tadalafil (CIALIS) 5 MG tablet Take 5 mg by mouth Daily.       No current facility-administered medications for this visit.     Lisinopril and Penicillins    Past Medical History:   Diagnosis Date   • Arthritis    • Coronary artery disease    • Cyst of mouth     removed    • H/O colonoscopy    • H/O right knee surgery    • History of ankle surgery 06/2020    x2   • Hypertension      Social History     Socioeconomic History   • Marital status:      Spouse name: Not on file   • Number of children: Not on file   • Years of education: Not on file   • Highest education level: Not on file   Tobacco Use   • Smoking status: Former Smoker     Quit date:      Years since quittin.5   • Smokeless tobacco: Never Used   • Tobacco comment:  "history of smoking x 5 years, quit at 22 years old..    Vaping Use   • Vaping Use: Never used   Substance and Sexual Activity   • Alcohol use: No     Comment: rarely   • Drug use: No     Family History   Problem Relation Age of Onset   • Cancer Mother    • Diabetes Mother    • Melanoma Father      Review of Systems   Constitutional: Positive for weight loss (Down 2 pounds). Negative for decreased appetite and malaise/fatigue.   HENT: Negative.    Eyes: Negative for blurred vision.   Cardiovascular: Negative for chest pain, dyspnea on exertion, leg swelling, palpitations and syncope.   Respiratory: Negative for shortness of breath and sleep disturbances due to breathing.    Endocrine: Negative.    Hematologic/Lymphatic: Negative for bleeding problem. Does not bruise/bleed easily.   Skin: Negative.    Musculoskeletal: Negative for falls and myalgias.   Gastrointestinal: Negative for abdominal pain, heartburn and melena.   Genitourinary: Negative for hematuria.   Neurological: Negative for dizziness and light-headedness.   Psychiatric/Behavioral: Negative for altered mental status.   Allergic/Immunologic: Negative.       Objective     /58 (BP Location: Right arm)   Pulse 56   Ht 182 cm (71.65\")   Wt 116 kg (255 lb 3.2 oz)   BMI 34.95 kg/m²     Vitals and nursing note reviewed.   Constitutional:       General: Not in acute distress.     Appearance: Well-developed. Not diaphoretic.   Eyes:      Pupils: Pupils are equal, round, and reactive to light.   HENT:      Head: Normocephalic.   Pulmonary:      Effort: Pulmonary effort is normal. No respiratory distress.      Breath sounds: Normal breath sounds.   Cardiovascular:      Normal rate. Regular rhythm.   Pulses:     Intact distal pulses.   Abdominal:      General: Bowel sounds are normal.      Palpations: Abdomen is soft.   Musculoskeletal: Normal range of motion.      Cervical back: Normal range of motion. Skin:     General: Skin is warm and dry. "   Neurological:      Mental Status: Alert and oriented to person, place, and time.        Procedures          Problem List Items Addressed This Visit        Cardiac and Vasculature    IHD (ischemic heart disease) - Primary    Relevant Medications    metoprolol tartrate (LOPRESSOR) 25 MG tablet    Other Relevant Orders    CBC (No Diff)    Comprehensive Metabolic Panel    Essential hypertension    Relevant Medications    losartan (COZAAR) 50 MG tablet    metoprolol tartrate (LOPRESSOR) 25 MG tablet    Other Relevant Orders    CBC (No Diff)    Comprehensive Metabolic Panel    Hypercholesteremia    Relevant Medications    fenofibric acid (TRILIPIX) 135 MG capsule delayed-release delayed release capsule    rosuvastatin (CRESTOR) 10 MG tablet    Other Relevant Orders    CBC (No Diff)    Comprehensive Metabolic Panel    Lipid Panel      Other Visit Diagnoses     Hypothyroidism (acquired)        Relevant Medications    metoprolol tartrate (LOPRESSOR) 25 MG tablet    Other Relevant Orders    CBC (No Diff)    Comprehensive Metabolic Panel    TSH         1. HTN- well controlled. Continue losartan, metoprolol, nifedipine. Limit Na.  Weight slightly improved.  Continue low-sodium, heart healthy diet.  Activity as he can tolerate.        2. CAD- s/p CABG. Managed appropriately with aspirin, statin, beta blocker. Stress test in 2019 showed lateral wall ischemia managed medically.  He remains asymptomatic.  Will continue to monitor.      3. PAF- brief post op PAF, has maintained sinus for several years now. Continue aspirin and beta blocker.      4. Hypercholesterolemia- excellent lipid control with Crestor and fenofibrate. Continue current plan.       5. ZEINAB- managed with CPAP.      He appears stable from a cardiac standpoint.  Lab order given [facility].    Patient's Body mass index is 34.95 kg/m². indicating that he is obese (BMI >30). Obesity-related health conditions include the following: obstructive sleep apnea,  hypertension, coronary heart disease, diabetes mellitus and dyslipidemias. Obesity is improving with lifestyle modifications. BMI is is above average; BMI management plan is completed. We discussed portion control and increasing exercise.            Electronically signed by JAMEE Leslie,  June 23, 2021 11:20 EDT

## 2021-11-10 ENCOUNTER — OFFICE VISIT (OUTPATIENT)
Dept: CARDIOLOGY | Facility: CLINIC | Age: 71
End: 2021-11-10

## 2021-11-10 VITALS
HEART RATE: 52 BPM | SYSTOLIC BLOOD PRESSURE: 124 MMHG | HEIGHT: 72 IN | BODY MASS INDEX: 33.7 KG/M2 | TEMPERATURE: 97.8 F | WEIGHT: 248.8 LBS | DIASTOLIC BLOOD PRESSURE: 70 MMHG

## 2021-11-10 DIAGNOSIS — R06.02 SHORTNESS OF BREATH: ICD-10-CM

## 2021-11-10 DIAGNOSIS — I10 ESSENTIAL HYPERTENSION: ICD-10-CM

## 2021-11-10 DIAGNOSIS — R53.83 OTHER FATIGUE: ICD-10-CM

## 2021-11-10 DIAGNOSIS — I25.9 IHD (ISCHEMIC HEART DISEASE): Primary | ICD-10-CM

## 2021-11-10 DIAGNOSIS — G47.30 SLEEP APNEA IN ADULT: ICD-10-CM

## 2021-11-10 DIAGNOSIS — E78.00 HYPERCHOLESTEREMIA: ICD-10-CM

## 2021-11-10 PROCEDURE — 99214 OFFICE O/P EST MOD 30 MIN: CPT | Performed by: NURSE PRACTITIONER

## 2021-11-10 NOTE — PROGRESS NOTES
Chief Complaint   Patient presents with   • Follow-up     Cardiac management . Has no cardiac complaints today.   • LABS     Had labs per PCP , his Thyroid was abnormal and Levothyroxine dose adjusted.   • Shortness of Breath     His wife reports that he gets SOA with exertion , says his breathing has gotten more heavy at night. He continues to use his CPAP   • Med Refill     No refills needed today .Had med list today       Subjective       Carrington Fontaine is a 71 y.o. male with hypertension, hyperlipidemia, and IHD diagnosed in 2014 when he presented with chest pain found to have inferolateral ischemia on stress. Cardiac cath revealed multivessel disease and he underwent 4 vessel CABG.  He developed post op atrial fib and underwent BLAIR and cardioversion without success. Later, he converted on his own and remained in sinus, so Coumadin and amiodarone were stopped. He has been managed conservatively and has done very well. Repeat stress test 7/2019 showed excellent effort tolerance, lateral ischemia, hypertensive bp response, normal LV function. Lopressor added back at 25mg BID and Cartia advised to be changed to Procardia.     Today he comes to the office accompanied by his wife for a follow-up visit.  He denies chest pain, palpitations, or edema.  He does admit to shortness of breath with exertion.  His wife is concerned that he is having symptoms similar to symptoms having prior to bypass surgery.  At night he is more restless during the day more short of breath.  No recent change in cardiac medications noted.      Cardiac History:    Past Surgical History:   Procedure Laterality Date   • CARDIAC CATHETERIZATION  09/14/2014    80% LAD, 70% D1 &D2, 80% OM, 75% RCA.   • CARDIOVASCULAR STRESS TEST  08/29/2014    9min, 152/74, inferiolateral ischemia, Imdur, cath   • CARDIOVASCULAR STRESS TEST  07/18/2019    9 Min. 10.40 METS. 81% THR. BP- 213/73. 2 mm ST depression. EF 66%. Lateral Ischemia.   • CORONARY ARTERY  BYPASS GRAFT  2014    CABG-() LIMA-LAD, SVG-OM, SVG-PDA, SVG-RCA.   • ECHO - CONVERTED  2014    Echo-EF 65%   • ECHO - CONVERTED  2019    EF 61-65%, mild MR, RSVP 33mmHg   • TRANSESOPHAGEAL ECHOCARDIOGRAM (BLAIR) AND CARDIOVERSION  2014    @ [facility]- EF 50%, no mass, unsuccessful cardioversion x 4.       Current Outpatient Medications   Medication Sig Dispense Refill   • aspirin 81 MG EC tablet Take 81 mg by mouth Every Night.     • fenofibric acid (TRILIPIX) 135 MG capsule delayed-release delayed release capsule Take 1 capsule by mouth Daily. 90 capsule 3   • FLUoxetine (PROzac) 20 MG capsule Take 20 mg by mouth 2 (Two) Times a Day.     • ibuprofen (ADVIL,MOTRIN) 800 MG tablet Take 800 mg by mouth Daily.     • levothyroxine (SYNTHROID, LEVOTHROID) 150 MCG tablet Take 150 mcg by mouth Daily.     • losartan (COZAAR) 50 MG tablet Take 1 tablet by mouth 2 (Two) Times a Day. 180 tablet 3   • metoprolol tartrate (LOPRESSOR) 25 MG tablet Take 1 tablet by mouth 2 (Two) Times a Day. 180 tablet 3   • NIFEdipine XL (PROCARDIA XL) 60 MG 24 hr tablet TAKE 1 TABLET DAILY 90 tablet 3   • omeprazole (priLOSEC) 20 MG capsule Take 40 mg by mouth Daily.     • rosuvastatin (CRESTOR) 10 MG tablet Take 1 tablet by mouth Daily. 90 tablet 3   • tadalafil (CIALIS) 5 MG tablet Take 5 mg by mouth Daily.       No current facility-administered medications for this visit.       Lisinopril and Penicillins    Past Medical History:   Diagnosis Date   • Arthritis    • Coronary artery disease    • Cyst of mouth     removed    • H/O colonoscopy    • H/O right knee surgery    • History of ankle surgery 06/2020    x2   • Hypertension        Social History     Socioeconomic History   • Marital status:    Tobacco Use   • Smoking status: Former Smoker     Quit date:      Years since quittin.8   • Smokeless tobacco: Never Used   • Tobacco comment: history of smoking x 5 years, quit at 22 years old..    Vaping Use    • Vaping Use: Never used   Substance and Sexual Activity   • Alcohol use: No     Comment: rarely   • Drug use: No       Family History   Problem Relation Age of Onset   • Cancer Mother    • Diabetes Mother    • Melanoma Father        Review of Systems   Constitutional: Positive for malaise/fatigue. Negative for decreased appetite and diaphoresis.   HENT: Negative for nosebleeds.    Eyes: Negative for blurred vision.   Cardiovascular: Negative for chest pain, claudication, cyanosis, dyspnea on exertion, irregular heartbeat, leg swelling, near-syncope, orthopnea, palpitations, paroxysmal nocturnal dyspnea and syncope.   Respiratory: Positive for shortness of breath and sleep disturbances due to breathing.    Endocrine: Negative for cold intolerance and heat intolerance.   Hematologic/Lymphatic: Does not bruise/bleed easily.   Skin: Negative for rash.   Musculoskeletal: Positive for joint pain (right ankle). Negative for myalgias.   Gastrointestinal: Negative for heartburn, melena and nausea.   Genitourinary: Negative for dysuria and hematuria.   Neurological: Negative for dizziness and light-headedness.   Psychiatric/Behavioral: The patient does not have insomnia and is not nervous/anxious.         BP Readings from Last 5 Encounters:   11/10/21 124/70   06/21/21 110/58   12/23/20 136/70   07/15/20 110/60   01/15/20 120/80       Wt Readings from Last 5 Encounters:   11/10/21 113 kg (248 lb 12.8 oz)   06/21/21 116 kg (255 lb 3.2 oz)   12/23/20 117 kg (257 lb 12.8 oz)   07/15/20 113 kg (248 lb 12.8 oz)   01/15/20 103 kg (228 lb)       Objective      Labs 7/7/2021: WBC 8.1, RBC 4.78, hemoglobin 15.8, hematocrit 47.6, platelets 231, glucose 130, BUN 37, creatinine 1.5, GFR 47, sodium 137, potassium 4.6, chloride 104, CO2 25, calcium 9.6, total cholesterol 108, triglycerides 107, HDL 42, LDL 45, total protein 702, BUN 4.6, total bili 0.5, AST 22, ALT 18, ALP 72, free T4 1.1, TSH 4.64    January 2021 showed lipids very  "well controlled LDL/HDL 49/46.    /70 (BP Location: Left arm)   Pulse 52   Temp 97.8 °F (36.6 °C)   Ht 182 cm (71.65\")   Wt 113 kg (248 lb 12.8 oz)   BMI 34.07 kg/m²     Vitals and nursing note reviewed.   Eyes:      Pupils: Pupils are equal, round, and reactive to light.   HENT:      Head: Normocephalic.   Neck:      Vascular: No carotid bruit or JVD.   Pulmonary:      Breath sounds: Normal breath sounds.   Cardiovascular:      Normal rate. Regular rhythm.   Pulses:     Intact distal pulses.   Edema:     Peripheral edema absent.   Abdominal:      General: Bowel sounds are normal.      Palpations: Abdomen is soft.   Musculoskeletal: Normal range of motion.      Cervical back: Normal range of motion. Skin:     General: Skin is warm.   Neurological:      Mental Status: Alert and oriented to person, place, and time.          Procedures: none today          Assessment/Plan   Diagnoses and all orders for this visit:    1. IHD (ischemic heart disease) (Primary)  -     Stress Test With Myocardial Perfusion One Day; Future  -     Adult Transthoracic Echo Complete W/ Cont if Necessary Per Protocol; Future    2. Shortness of breath  -     Stress Test With Myocardial Perfusion One Day; Future  -     Adult Transthoracic Echo Complete W/ Cont if Necessary Per Protocol; Future    3. Other fatigue    4. Sleep apnea in adult  -     Stress Test With Myocardial Perfusion One Day; Future  -     Adult Transthoracic Echo Complete W/ Cont if Necessary Per Protocol; Future    5. Essential hypertension    6. Hypercholesteremia      IHD/shortness of breath/fatigue -previous anginal symptoms include shortness of breath and fatigue.  Since last visit he has had recurrence of the symptoms.  Due to his significant cardiac history and risk we will repeat nuclear stress test to further evaluate.  Continue statin, aspirin, antihypertensive agents.    ZEINAB-compliant with CPAP therapy.  Weight loss encouraged.    Hypertension-blood " pressure controlled.  Continue Procardia 60 mg daily, Lopressor 25 mg twice a day, and losartan 50 mg twice a day.    Hypercholesterolemia/hypertriglyceridemia-currently on Crestor and Trilipix.  He will follow with you for lab orders/management.  Diet also encouraged.    Patient's Body mass index is 34.07 kg/m². indicating that he is obese (BMI >30). Obesity-related health conditions include the following: hypertension, coronary heart disease and dyslipidemias. Obesity is unchanged. BMI is is above average; BMI management plan is completed. We discussed portion control and increasing exercise..     Further recommendations based on cardiac test results.  A 6-month follow-up visit scheduled.  Please call sooner for cardiac concerns.            Electronically signed by JAMEE Cain,  November 10, 2021 16:56 EST

## 2021-11-22 ENCOUNTER — HOSPITAL ENCOUNTER (OUTPATIENT)
Dept: CARDIOLOGY | Facility: HOSPITAL | Age: 71
Discharge: HOME | End: 2021-11-22

## 2021-11-22 DIAGNOSIS — G47.30 SLEEP APNEA IN ADULT: ICD-10-CM

## 2021-11-22 DIAGNOSIS — I25.9 IHD (ISCHEMIC HEART DISEASE): ICD-10-CM

## 2021-11-22 DIAGNOSIS — R06.02 SHORTNESS OF BREATH: ICD-10-CM

## 2021-11-22 LAB
BH CV ECHO MEAS - ACS: 2.4 CM
BH CV ECHO MEAS - AO MAX PG: 5.4 MMHG
BH CV ECHO MEAS - AO MEAN PG: 3 MMHG
BH CV ECHO MEAS - AO ROOT AREA (BSA CORRECTED): 1.4
BH CV ECHO MEAS - AO ROOT AREA: 8.8 CM^2
BH CV ECHO MEAS - AO ROOT DIAM: 3.4 CM
BH CV ECHO MEAS - AO V2 MAX: 116 CM/SEC
BH CV ECHO MEAS - AO V2 MEAN: 87.9 CM/SEC
BH CV ECHO MEAS - AO V2 VTI: 32.3 CM
BH CV ECHO MEAS - BSA(HAYCOCK): 2.4 M^2
BH CV ECHO MEAS - BSA: 2.3 M^2
BH CV ECHO MEAS - BZI_BMI: 34.6 KILOGRAMS/M^2
BH CV ECHO MEAS - BZI_METRIC_HEIGHT: 180.3 CM
BH CV ECHO MEAS - BZI_METRIC_WEIGHT: 112.5 KG
BH CV ECHO MEAS - EDV(CUBED): 84.6 ML
BH CV ECHO MEAS - EDV(MOD-SP4): 151 ML
BH CV ECHO MEAS - EDV(TEICH): 87.2 ML
BH CV ECHO MEAS - EF(CUBED): 50.6 %
BH CV ECHO MEAS - EF(MOD-SP4): 66.1 %
BH CV ECHO MEAS - EF(TEICH): 42.9 %
BH CV ECHO MEAS - ESV(CUBED): 41.8 ML
BH CV ECHO MEAS - ESV(MOD-SP4): 51.2 ML
BH CV ECHO MEAS - ESV(TEICH): 49.8 ML
BH CV ECHO MEAS - FS: 21 %
BH CV ECHO MEAS - IVS/LVPW: 1.1
BH CV ECHO MEAS - IVSD: 1.5 CM
BH CV ECHO MEAS - LA DIMENSION: 4.1 CM
BH CV ECHO MEAS - LA/AO: 1.2
BH CV ECHO MEAS - LV DIASTOLIC VOL/BSA (35-75): 65.3 ML/M^2
BH CV ECHO MEAS - LV IVRT: 0.11 SEC
BH CV ECHO MEAS - LV MASS(C)D: 248.6 GRAMS
BH CV ECHO MEAS - LV MASS(C)DI: 107.6 GRAMS/M^2
BH CV ECHO MEAS - LV SYSTOLIC VOL/BSA (12-30): 22.2 ML/M^2
BH CV ECHO MEAS - LVIDD: 4.4 CM
BH CV ECHO MEAS - LVIDS: 3.5 CM
BH CV ECHO MEAS - LVLD AP4: 9.4 CM
BH CV ECHO MEAS - LVLS AP4: 8 CM
BH CV ECHO MEAS - LVOT AREA (M): 3.5 CM^2
BH CV ECHO MEAS - LVOT AREA: 3.5 CM^2
BH CV ECHO MEAS - LVOT DIAM: 2.1 CM
BH CV ECHO MEAS - LVPWD: 1.4 CM
BH CV ECHO MEAS - MV A MAX VEL: 66.4 CM/SEC
BH CV ECHO MEAS - MV DEC SLOPE: 299 CM/SEC^2
BH CV ECHO MEAS - MV E MAX VEL: 77.1 CM/SEC
BH CV ECHO MEAS - MV E/A: 1.2
BH CV ECHO MEAS - RAP SYSTOLE: 10 MMHG
BH CV ECHO MEAS - RVDD: 3.3 CM
BH CV ECHO MEAS - RVSP: 32.7 MMHG
BH CV ECHO MEAS - SI(AO): 123.2 ML/M^2
BH CV ECHO MEAS - SI(CUBED): 18.5 ML/M^2
BH CV ECHO MEAS - SI(MOD-SP4): 43.2 ML/M^2
BH CV ECHO MEAS - SI(TEICH): 16.2 ML/M^2
BH CV ECHO MEAS - SV(AO): 284.7 ML
BH CV ECHO MEAS - SV(CUBED): 42.8 ML
BH CV ECHO MEAS - SV(MOD-SP4): 99.8 ML
BH CV ECHO MEAS - SV(TEICH): 37.4 ML
BH CV ECHO MEAS - TR MAX VEL: 238 CM/SEC
BH CV REST NUCLEAR ISOTOPE DOSE: 10 MCI
BH CV STRESS COMMENTS STAGE 1: NORMAL
BH CV STRESS DOSE REGADENOSON STAGE 1: 0.4
BH CV STRESS DURATION MIN STAGE 1: 0
BH CV STRESS DURATION SEC STAGE 1: 10
BH CV STRESS NUCLEAR ISOTOPE DOSE: 30 MCI
BH CV STRESS PROTOCOL 1: NORMAL
BH CV STRESS RECOVERY BP: NORMAL MMHG
BH CV STRESS RECOVERY HR: 54 BPM
BH CV STRESS STAGE 1: 1
LV EF NUC BP: 54 %
MAXIMAL PREDICTED HEART RATE: 151 BPM
MAXIMAL PREDICTED HEART RATE: 151 BPM
PERCENT MAX PREDICTED HR: 47.02 %
STRESS BASELINE BP: NORMAL MMHG
STRESS BASELINE HR: 53 BPM
STRESS PERCENT HR: 55 %
STRESS POST PEAK BP: NORMAL MMHG
STRESS POST PEAK HR: 71 BPM
STRESS TARGET HR: 128 BPM
STRESS TARGET HR: 128 BPM

## 2021-11-22 PROCEDURE — 93306 TTE W/DOPPLER COMPLETE: CPT | Performed by: INTERNAL MEDICINE

## 2021-11-22 PROCEDURE — 78452 HT MUSCLE IMAGE SPECT MULT: CPT

## 2021-11-22 PROCEDURE — A9500 TC99M SESTAMIBI: HCPCS | Performed by: INTERNAL MEDICINE

## 2021-11-22 PROCEDURE — 25010000002 REGADENOSON 0.4 MG/5ML SOLUTION: Performed by: INTERNAL MEDICINE

## 2021-11-22 PROCEDURE — 0 TECHNETIUM SESTAMIBI: Performed by: INTERNAL MEDICINE

## 2021-11-22 PROCEDURE — 93306 TTE W/DOPPLER COMPLETE: CPT

## 2021-11-22 PROCEDURE — 93018 CV STRESS TEST I&R ONLY: CPT | Performed by: INTERNAL MEDICINE

## 2021-11-22 PROCEDURE — 78452 HT MUSCLE IMAGE SPECT MULT: CPT | Performed by: INTERNAL MEDICINE

## 2021-11-22 PROCEDURE — 93017 CV STRESS TEST TRACING ONLY: CPT

## 2021-11-22 RX ADMIN — TECHNETIUM TC 99M SESTAMIBI 1 DOSE: 1 INJECTION INTRAVENOUS at 11:59

## 2021-11-22 RX ADMIN — REGADENOSON 0.4 MG: 0.08 INJECTION, SOLUTION INTRAVENOUS at 13:14

## 2021-11-22 RX ADMIN — TECHNETIUM TC 99M SESTAMIBI 1 DOSE: 1 INJECTION INTRAVENOUS at 13:14

## 2022-03-03 RX ORDER — NIFEDIPINE 60 MG/1
TABLET, EXTENDED RELEASE ORAL
Qty: 90 TABLET | Refills: 3 | Status: SHIPPED | OUTPATIENT
Start: 2022-03-03 | End: 2022-08-10 | Stop reason: SDUPTHER

## 2022-07-11 DIAGNOSIS — I10 ESSENTIAL HYPERTENSION: ICD-10-CM

## 2022-07-11 DIAGNOSIS — I25.9 IHD (ISCHEMIC HEART DISEASE): ICD-10-CM

## 2022-07-21 DIAGNOSIS — I10 ESSENTIAL HYPERTENSION: ICD-10-CM

## 2022-07-21 DIAGNOSIS — E78.00 HYPERCHOLESTEREMIA: ICD-10-CM

## 2022-07-21 RX ORDER — LOSARTAN POTASSIUM 50 MG/1
TABLET ORAL
Qty: 180 TABLET | Refills: 3 | Status: SHIPPED | OUTPATIENT
Start: 2022-07-21 | End: 2022-09-27

## 2022-07-21 RX ORDER — FENOFIBRIC ACID 135 MG/1
CAPSULE, DELAYED RELEASE ORAL
Qty: 90 CAPSULE | Refills: 3 | Status: SHIPPED | OUTPATIENT
Start: 2022-07-21 | End: 2023-03-27 | Stop reason: SDUPTHER

## 2022-08-10 ENCOUNTER — OFFICE VISIT (OUTPATIENT)
Dept: CARDIOLOGY | Facility: CLINIC | Age: 72
End: 2022-08-10

## 2022-08-10 VITALS
HEART RATE: 60 BPM | HEIGHT: 72 IN | DIASTOLIC BLOOD PRESSURE: 60 MMHG | SYSTOLIC BLOOD PRESSURE: 106 MMHG | BODY MASS INDEX: 34.86 KG/M2 | WEIGHT: 257.4 LBS

## 2022-08-10 DIAGNOSIS — I25.9 IHD (ISCHEMIC HEART DISEASE): Primary | ICD-10-CM

## 2022-08-10 DIAGNOSIS — G47.30 SLEEP APNEA IN ADULT: ICD-10-CM

## 2022-08-10 DIAGNOSIS — I10 ESSENTIAL HYPERTENSION: ICD-10-CM

## 2022-08-10 DIAGNOSIS — E78.00 HYPERCHOLESTEREMIA: ICD-10-CM

## 2022-08-10 PROCEDURE — 99214 OFFICE O/P EST MOD 30 MIN: CPT | Performed by: NURSE PRACTITIONER

## 2022-08-10 RX ORDER — MULTIPLE VITAMINS W/ MINERALS TAB 9MG-400MCG
1 TAB ORAL DAILY
COMMUNITY

## 2022-08-10 RX ORDER — NIFEDIPINE 60 MG/1
60 TABLET, EXTENDED RELEASE ORAL DAILY
Qty: 90 TABLET | Refills: 3 | Status: SHIPPED | OUTPATIENT
Start: 2022-08-10 | End: 2022-09-27

## 2022-08-10 RX ORDER — CELECOXIB 200 MG/1
200 CAPSULE ORAL 2 TIMES DAILY
COMMUNITY

## 2022-08-10 RX ORDER — ROSUVASTATIN CALCIUM 10 MG/1
10 TABLET, COATED ORAL DAILY
Qty: 90 TABLET | Refills: 3 | Status: SHIPPED | OUTPATIENT
Start: 2022-08-10 | End: 2023-03-27 | Stop reason: SDUPTHER

## 2022-08-10 RX ORDER — CARBOXYMETHYLCELLULOSE/CITRIC 0.75 G
3 CAPSULE ORAL 2 TIMES DAILY
Qty: 90 CAPSULE | Refills: 6 | Status: SHIPPED | OUTPATIENT
Start: 2022-08-10 | End: 2022-09-27

## 2022-08-10 NOTE — PROGRESS NOTES
Chief Complaint   Patient presents with   • Follow-up     Cardiac management   • Lab     Last labs in chart.   • CPAP     Wears at night.   • Med Refill     Needs refills on Nifedipine and Crestor-90 day.     Subjective       Carrington Fontaine is a 71 y.o. male  with hypertension, hyperlipidemia, and IHD diagnosed in 2014 when he presented with chest pain found to have inferolateral ischemia on stress. Cardiac cath revealed multivessel disease and he underwent 4 vessel CABG.  He developed post op atrial fib and underwent BLAIR and cardioversion without success. Later, he converted on his own and remained in sinus, so Coumadin and amiodarone were stopped. He has been managed conservatively and has done very well. Repeat stress test 7/2019 showed excellent effort tolerance, lateral ischemia, hypertensive bp response, normal LV function. Lopressor added back at 25mg BID and Cartia changed to Procardia. Stress and echo repeated 1122/21 showed reverse redistribution in the lateral wall. No definite ischemia. Medical management continued.     He returns today for follow up. He feels very well from a cardiac standpoint. He remains active. He bought a house in [place] and plans to spend winter there. Labs per Dr. Morrow, on 6/10/22, A1C 5.5%, normal CBC, , Tri 77, HDL 44, LDL 48. Cr 1.4.          Cardiac History:    Past Surgical History:   Procedure Laterality Date   • CARDIAC CATHETERIZATION  09/14/2014    80% LAD, 70% D1 &D2, 80% OM, 75% RCA.   • CARDIOVASCULAR STRESS TEST  08/29/2014    9min, 152/74, inferiolateral ischemia, Imdur, cath   • CARDIOVASCULAR STRESS TEST  07/18/2019    9 Min. 10.40 METS. 81% THR. BP- 213/73. 2 mm ST depression. EF 66%. Lateral Ischemia.   • CARDIOVASCULAR STRESS TEST  11/22/2021    Lexiscan- EF 54%. Reverse Redistribution Lateral Wall   • CORONARY ARTERY BYPASS GRAFT  09/22/2014    CABG-() LIMA-LAD, SVG-OM, SVG-PDA, SVG-RCA.   • ECHO - CONVERTED  08/29/2014    Echo-EF 65%   •  ECHO - CONVERTED  07/18/2019    EF 61-65%, mild MR, RSVP 33mmHg   • ECHO - CONVERTED  11/22/2021    EF 55%. Lateral WMA> LA- 4.1 Cm. Trace MR & AI. RVSP- 33 mmHg   • TRANSESOPHAGEAL ECHOCARDIOGRAM (BLAIR) AND CARDIOVERSION  09/25/2014    @ [facility]- EF 50%, no mass, unsuccessful cardioversion x 4.     Current Outpatient Medications   Medication Sig Dispense Refill   • aspirin 81 MG EC tablet Take 81 mg by mouth Every Night.     • celecoxib (CeleBREX) 200 MG capsule Take 200 mg by mouth 2 (Two) Times a Day.     • fenofibric acid (TRILIPIX) 135 MG capsule delayed-release delayed release capsule TAKE 1 CAPSULE DAILY 90 capsule 3   • levothyroxine (SYNTHROID, LEVOTHROID) 150 MCG tablet Take 150 mcg by mouth Daily.     • losartan (COZAAR) 50 MG tablet TAKE 1 TABLET TWICE A  tablet 3   • metoprolol tartrate (LOPRESSOR) 25 MG tablet TAKE 1 TABLET TWICE A  tablet 3   • multivitamin with minerals (MULTIVITAMIN ADULT PO) Take 1 tablet by mouth Daily.     • NIFEdipine XL (PROCARDIA XL) 60 MG 24 hr tablet Take 1 tablet by mouth Daily. 90 tablet 3   • omeprazole (priLOSEC) 20 MG capsule Take 40 mg by mouth Daily.     • rosuvastatin (CRESTOR) 10 MG tablet Take 1 tablet by mouth Daily. 90 tablet 3   • tadalafil (CIALIS) 5 MG tablet Take 20 mg by mouth Daily.     • Carboxymeth-Cellulose-CitricAc (Plenity) capsule Take 3 capsules by mouth 2 (Two) Times a Day. 90 capsule 6   • FLUoxetine (PROzac) 20 MG capsule Take 20 mg by mouth 2 (Two) Times a Day.     • ibuprofen (ADVIL,MOTRIN) 800 MG tablet Take 800 mg by mouth Daily.       No current facility-administered medications for this visit.     Lisinopril and Penicillins    Past Medical History:   Diagnosis Date   • Arthritis    • Coronary artery disease    • Cyst of mouth     removed    • H/O colonoscopy    • H/O right knee surgery    • History of ankle surgery 06/2020    x2   • Hypertension      Social History     Socioeconomic History   • Marital status:    Tobacco Use  "  • Smoking status: Former Smoker     Quit date:      Years since quittin.6   • Smokeless tobacco: Never Used   • Tobacco comment: history of smoking x 5 years, quit at 22 years old..    Vaping Use   • Vaping Use: Never used   Substance and Sexual Activity   • Alcohol use: No     Comment: rarely   • Drug use: No   • Sexual activity: Defer     Family History   Problem Relation Age of Onset   • Cancer Mother    • Diabetes Mother    • Melanoma Father      Review of Systems   Constitutional: Positive for weight gain (+9). Negative for decreased appetite and malaise/fatigue.   HENT: Negative.    Eyes: Negative for blurred vision.   Cardiovascular: Negative for chest pain, dyspnea on exertion, leg swelling, palpitations and syncope.   Respiratory: Negative for shortness of breath and sleep disturbances due to breathing.    Endocrine: Negative.    Hematologic/Lymphatic: Negative for bleeding problem. Does not bruise/bleed easily.   Skin: Negative.    Musculoskeletal: Positive for joint pain (right ankle ) and stiffness. Negative for falls and myalgias.   Gastrointestinal: Negative for abdominal pain, heartburn and melena.   Genitourinary: Negative for hematuria.   Neurological: Negative for dizziness and light-headedness.   Psychiatric/Behavioral: Negative for altered mental status.   Allergic/Immunologic: Negative.         Objective     /60 (BP Location: Right arm)   Pulse 60   Ht 182 cm (71.65\")   Wt 117 kg (257 lb 6.4 oz)   BMI 35.25 kg/m²     Vitals and nursing note reviewed.   Constitutional:       General: Not in acute distress.     Appearance: Well-developed. Not diaphoretic.   Eyes:      Pupils: Pupils are equal, round, and reactive to light.   HENT:      Head: Normocephalic.   Pulmonary:      Effort: Pulmonary effort is normal. No respiratory distress.      Breath sounds: Normal breath sounds.   Cardiovascular:      Normal rate. Regular rhythm.   Pulses:     Intact distal pulses.   Edema:     " Ankle: 1+ edema of the right ankle.  Abdominal:      General: Bowel sounds are normal.      Palpations: Abdomen is soft.   Musculoskeletal: Normal range of motion.      Cervical back: Normal range of motion. Skin:     General: Skin is warm and dry.   Neurological:      Mental Status: Alert and oriented to person, place, and time.          Procedures          Problem List Items Addressed This Visit        Cardiac and Vasculature    IHD (ischemic heart disease) - Primary    Relevant Medications    NIFEdipine XL (PROCARDIA XL) 60 MG 24 hr tablet    Essential hypertension    Relevant Medications    NIFEdipine XL (PROCARDIA XL) 60 MG 24 hr tablet    Hypercholesteremia    Relevant Medications    rosuvastatin (CRESTOR) 10 MG tablet       Sleep    Sleep apnea in adult         1. IHD- s/p CABG, most recent stress in November 2021 showed reverse redistribution without ischemic changes. Normal EF. He remains mostly asymptomatic. Continue medical management.     2. HTN- very well controlled, continue nifedipine, losartan, metoprolol. Weight loss. Na restriction.     3. Hyperlipidemia- very well controlled with fenofibrate and Crestor. LDL/HDL/trigs at goal. Thank you for sending his labs.     4. ZEINAB- compliant with CPAP.     5. Metabolic syndrome- he was given a prescription for Plenity capsules for weight loss.     Class 2 Severe Obesity (BMI >=35 and <=39.9). Obesity-related health conditions include the following: obstructive sleep apnea, hypertension, coronary heart disease, diabetes mellitus, dyslipidemias and GERD. Obesity is worsening. BMI is is above average; BMI management plan is completed. We discussed portion control and increasing exercise.             Electronically signed by JAMEE Leslie,  August 12, 2022 15:42 EDT

## 2022-08-24 ENCOUNTER — TELEPHONE (OUTPATIENT)
Dept: CARDIOLOGY | Facility: CLINIC | Age: 72
End: 2022-08-24

## 2022-08-24 NOTE — TELEPHONE ENCOUNTER
Received fax from Dr. Gr for cardiac clearance for patient to have a colonoscopy. Patient is on aspirin and they are requesting to hold. According to our records, I do not see where patient has had any stenting. Patient had a CABG on 09/22/14.         Fax [phone]

## 2022-09-26 ENCOUNTER — TELEPHONE (OUTPATIENT)
Dept: CARDIOLOGY | Facility: CLINIC | Age: 72
End: 2022-09-26

## 2022-09-26 NOTE — TELEPHONE ENCOUNTER
Wife Madeline called reporting that patient has had 2 episodes of shortness of air since last visit while he was outside working. Patient and wife had concerns since they are going to [place] soon for the winter and past history. He has decreased sodium in diet and follow diet from last visit. He is requesting to come in and speak prior to going to [place].

## 2022-09-27 ENCOUNTER — OFFICE VISIT (OUTPATIENT)
Dept: CARDIOLOGY | Facility: CLINIC | Age: 72
End: 2022-09-27

## 2022-09-27 VITALS
DIASTOLIC BLOOD PRESSURE: 70 MMHG | HEIGHT: 72 IN | WEIGHT: 264.6 LBS | BODY MASS INDEX: 35.84 KG/M2 | HEART RATE: 60 BPM | SYSTOLIC BLOOD PRESSURE: 110 MMHG

## 2022-09-27 DIAGNOSIS — I10 ESSENTIAL HYPERTENSION: ICD-10-CM

## 2022-09-27 PROCEDURE — 99214 OFFICE O/P EST MOD 30 MIN: CPT | Performed by: NURSE PRACTITIONER

## 2022-09-27 RX ORDER — ORAL SEMAGLUTIDE 3 MG/1
3 TABLET ORAL DAILY
Qty: 30 TABLET | Refills: 0 | Status: SHIPPED | OUTPATIENT
Start: 2022-09-27 | End: 2022-11-09

## 2022-09-27 RX ORDER — ORAL SEMAGLUTIDE 3 MG/1
3 TABLET ORAL DAILY
Qty: 30 TABLET | Refills: 0 | Status: SHIPPED | OUTPATIENT
Start: 2022-09-27 | End: 2022-09-27 | Stop reason: SDUPTHER

## 2022-09-27 RX ORDER — LOSARTAN POTASSIUM 50 MG/1
TABLET ORAL
Qty: 180 TABLET | Refills: 3
Start: 2022-09-27 | End: 2023-03-27 | Stop reason: SDUPTHER

## 2022-09-27 RX ORDER — FLUOXETINE HYDROCHLORIDE 20 MG/1
20 CAPSULE ORAL DAILY
COMMUNITY

## 2022-09-27 RX ORDER — NIFEDIPINE 60 MG/1
TABLET, EXTENDED RELEASE ORAL
Qty: 90 TABLET | Refills: 3
Start: 2022-09-27 | End: 2023-03-27 | Stop reason: SDUPTHER

## 2022-09-27 RX ORDER — SILDENAFIL CITRATE 20 MG/1
20 TABLET ORAL 3 TIMES DAILY
COMMUNITY

## 2022-09-27 NOTE — PROGRESS NOTES
Chief Complaint   Patient presents with   • Follow-up     Cardiac management   • Lab     Last labs in chart.   • Shortness of Breath     Has had couple episodes since last visit while working outside. Had concerns related to past history and getting ready to go to [place] for the winter. Wears CPAP at night.   • Blood pressure     Has been low for him, feeling tired and weak. B/P 120/60, he reports if bends over becomes dizzy.   • Diet     Wishes to discuss diet.   • Med Refill     Does not need refills at this time.     Subjective       Carrington Fontaine is a 72 y.o. male with hypertension, hyperlipidemia, and IHD diagnosed in 2014 when he presented with chest pain found to have inferolateral ischemia on stress. Cardiac cath revealed multivessel disease and he underwent 4 vessel CABG.  He developed post op atrial fib and underwent BLAIR and cardioversion without success. Later, he converted on his own and remained in sinus, so Coumadin and amiodarone were stopped. He has been managed conservatively and has done very well. Repeat stress test 7/2019 showed excellent effort tolerance, lateral ischemia, hypertensive bp response, normal LV function. Lopressor added back at 25mg BID and Cartia changed to Procardia. Stress and echo repeated 1122/21 showed reverse redistribution in the lateral wall. No definite ischemia. Medical management continued.  He returned last month for routine follow-up feeling well.    He contacted the office, wanted to be seen before traveling to [place].  He has noticed increasing dyspnea and weakness, especially when working outdoors.  He feels his blood pressure is too low and he request medication reduction.  Denies chest pain.  He is compliant with CPAP.  Labs per Dr. Morrow on 6/12/22 showed normal CBC, BUN 29/CR 1.41, GFR 50, normal electrolytes, , TRI 77, HDL 44, LDL 48.  Normal TSH 2.68.  He tried Plenity for weight loss but not effective.  Requesting alternative medication.          Cardiac History:    Past Surgical History:   Procedure Laterality Date   • CARDIAC CATHETERIZATION  09/14/2014    80% LAD, 70% D1 &D2, 80% OM, 75% RCA.   • CARDIOVASCULAR STRESS TEST  08/29/2014    9min, 152/74, inferiolateral ischemia, Imdur, cath   • CARDIOVASCULAR STRESS TEST  07/18/2019    9 Min. 10.40 METS. 81% THR. BP- 213/73. 2 mm ST depression. EF 66%. Lateral Ischemia.   • CARDIOVASCULAR STRESS TEST  11/22/2021    Lexiscan- EF 54%. Reverse Redistribution Lateral Wall   • CORONARY ARTERY BYPASS GRAFT  09/22/2014    CABG-() LIMA-LAD, SVG-OM, SVG-PDA, SVG-RCA.   • ECHO - CONVERTED  08/29/2014    Echo-EF 65%   • ECHO - CONVERTED  07/18/2019    EF 61-65%, mild MR, RSVP 33mmHg   • ECHO - CONVERTED  11/22/2021    EF 55%. Lateral WMA> LA- 4.1 Cm. Trace MR & AI. RVSP- 33 mmHg   • TRANSESOPHAGEAL ECHOCARDIOGRAM (BLAIR) AND CARDIOVERSION  09/25/2014    @ [facility]- EF 50%, no mass, unsuccessful cardioversion x 4.     Current Outpatient Medications   Medication Sig Dispense Refill   • aspirin 81 MG EC tablet Take 81 mg by mouth Every Night.     • celecoxib (CeleBREX) 200 MG capsule Take 200 mg by mouth 2 (Two) Times a Day.     • fenofibric acid (TRILIPIX) 135 MG capsule delayed-release delayed release capsule TAKE 1 CAPSULE DAILY 90 capsule 3   • FLUoxetine (PROzac) 20 MG capsule Take 20 mg by mouth Daily.     • levothyroxine (SYNTHROID, LEVOTHROID) 150 MCG tablet Take 150 mcg by mouth Daily.     • losartan (COZAAR) 50 MG tablet Reduce to once daily 180 tablet 3   • metoprolol tartrate (LOPRESSOR) 25 MG tablet TAKE 1 TABLET TWICE A  tablet 3   • multivitamin with minerals tablet tablet Take 1 tablet by mouth Daily.     • NIFEdipine XL (PROCARDIA XL) 60 MG 24 hr tablet Reduce to 1/2 tablet 90 tablet 3   • omeprazole (priLOSEC) 20 MG capsule Take 20 mg by mouth Daily.     • rosuvastatin (CRESTOR) 10 MG tablet Take 1 tablet by mouth Daily. 90 tablet 3   • Semaglutide (Rybelsus) 3 MG tablet Take 1 tablet by mouth  "Daily. 30 tablet 0   • sildenafil (REVATIO) 20 MG tablet Take 20 mg by mouth 3 (Three) Times a Day.       No current facility-administered medications for this visit.     Lisinopril and Penicillins    Past Medical History:   Diagnosis Date   • Arthritis    • Coronary artery disease    • Cyst of mouth     removed    • H/O colonoscopy    • H/O right knee surgery    • History of ankle surgery 06/2020    x2   • Hypertension      Social History     Socioeconomic History   • Marital status:    Tobacco Use   • Smoking status: Former Smoker     Quit date:      Years since quittin.7   • Smokeless tobacco: Never Used   • Tobacco comment: history of smoking x 5 years, quit at 22 years old..    Vaping Use   • Vaping Use: Never used   Substance and Sexual Activity   • Alcohol use: No     Comment: rarely   • Drug use: No   • Sexual activity: Defer     Family History   Problem Relation Age of Onset   • Cancer Mother    • Diabetes Mother    • Melanoma Father      Review of Systems   Constitutional: Negative for decreased appetite and malaise/fatigue.   HENT: Negative.    Eyes: Negative for blurred vision.   Cardiovascular: Negative for chest pain, dyspnea on exertion, leg swelling, palpitations and syncope.   Respiratory: Negative for shortness of breath and sleep disturbances due to breathing.    Endocrine: Negative.    Hematologic/Lymphatic: Negative for bleeding problem. Does not bruise/bleed easily.   Skin: Negative.    Musculoskeletal: Negative for falls and myalgias.   Gastrointestinal: Negative for abdominal pain, heartburn and melena.   Genitourinary: Negative for hematuria.   Neurological: Negative for dizziness and light-headedness.   Psychiatric/Behavioral: Negative for altered mental status.   Allergic/Immunologic: Negative.       Objective     /70 (BP Location: Right arm)   Pulse 60   Ht 182 cm (71.65\")   Wt 120 kg (264 lb 9.6 oz)   BMI 36.23 kg/m²     Vitals and nursing note reviewed. "   Constitutional:       General: Not in acute distress.     Appearance: Well-developed. Not diaphoretic.   Eyes:      Pupils: Pupils are equal, round, and reactive to light.   HENT:      Head: Normocephalic.   Pulmonary:      Effort: Pulmonary effort is normal. No respiratory distress.      Breath sounds: Normal breath sounds.   Cardiovascular:      Normal rate. Regular rhythm.   Pulses:     Intact distal pulses.   Abdominal:      General: Bowel sounds are normal.      Palpations: Abdomen is soft.   Musculoskeletal: Normal range of motion.      Cervical back: Normal range of motion. Skin:     General: Skin is warm and dry.   Neurological:      Mental Status: Alert and oriented to person, place, and time.        Procedures          Problem List Items Addressed This Visit        Cardiac and Vasculature    Essential hypertension    Relevant Medications    NIFEdipine XL (PROCARDIA XL) 60 MG 24 hr tablet    losartan (COZAAR) 50 MG tablet         1. IHD- s/p CABG, most recent stress in November 2021 showed reverse redistribution without ischemic changes. Normal EF. He remains mostly asymptomatic. Continue medical management.      2. HTN- well controlled at 110/70 but patient feels his symptoms are related to hypotension.  He request to reduce antihypertensives.  Advised to reduce losartan 50 mg twice daily to once daily, advised to reduce nifedipine 60 mg to 1/2 tablet (30 mg once daily.  Continue metoprolol.  Advised to monitor blood pressure closely.  Report changes.  Weight loss. Na restriction.      3. Hyperlipidemia- very well controlled with fenofibrate and Crestor. LDL/HDL/trigs at goal. Thank you for sending his labs.      4. ZEINAB- compliant with CPAP.      5. Metabolic syndrome- he was given a prescription for Rybelsus 3 mg daily for weight loss.   He has no history of medullary thyroid cancer.     Class 2 Severe Obesity (BMI >=35 and <=39.9). Obesity-related health conditions include the following: obstructive  sleep apnea, hypertension, coronary heart disease, diabetes mellitus and dyslipidemias. Obesity is unchanged. BMI is is above average; BMI management plan is completed. We discussed portion control and increasing exercise.               Electronically signed by JAMEE Leslie,  September 30, 2022 09:20 EDT

## 2022-09-30 ENCOUNTER — PRIOR AUTHORIZATION (OUTPATIENT)
Dept: CARDIOLOGY | Facility: CLINIC | Age: 72
End: 2022-09-30

## 2022-11-09 ENCOUNTER — TELEPHONE (OUTPATIENT)
Dept: CARDIOLOGY | Facility: CLINIC | Age: 72
End: 2022-11-09

## 2022-11-29 ENCOUNTER — TELEPHONE (OUTPATIENT)
Dept: CARDIOLOGY | Facility: CLINIC | Age: 72
End: 2022-11-29

## 2023-03-21 ENCOUNTER — TELEPHONE (OUTPATIENT)
Dept: CARDIOLOGY | Facility: CLINIC | Age: 73
End: 2023-03-21
Payer: MEDICARE

## 2023-03-21 DIAGNOSIS — I25.9 IHD (ISCHEMIC HEART DISEASE): ICD-10-CM

## 2023-03-21 DIAGNOSIS — E78.00 HYPERCHOLESTEREMIA: ICD-10-CM

## 2023-03-21 DIAGNOSIS — I10 ESSENTIAL HYPERTENSION: ICD-10-CM

## 2023-03-21 DIAGNOSIS — E88.81 METABOLIC SYNDROME: Primary | ICD-10-CM

## 2023-03-21 NOTE — TELEPHONE ENCOUNTER
Patient left a , he is requesting his cardiac medication he gets from [facility] to be faxed to Provider, Dr. Lidia Magallon, [phone].  [facility]    He also left Dr. Magallon's phone #[phone] and address [address] [place] [address]

## 2023-03-22 NOTE — TELEPHONE ENCOUNTER
Patient wanting scripts for cardiac medications with dx on script sent to Dr Lidia Magallon. [facility] with [facility] at [facility], fax number is [phone] for scripts.

## 2023-03-22 NOTE — TELEPHONE ENCOUNTER
Sp[facility] with [facility] at [facility] concerning [facility] information for records. Maritza reports correct fax number for Dr Magallon is [phone], the [phone] [facility] [place], [place].

## 2023-03-27 RX ORDER — LOSARTAN POTASSIUM 50 MG/1
TABLET ORAL
Qty: 180 TABLET | Refills: 3 | Status: SHIPPED | OUTPATIENT
Start: 2023-03-27

## 2023-03-27 RX ORDER — ROSUVASTATIN CALCIUM 10 MG/1
10 TABLET, COATED ORAL DAILY
Qty: 90 TABLET | Refills: 3 | Status: SHIPPED | OUTPATIENT
Start: 2023-03-27

## 2023-03-27 RX ORDER — FENOFIBRIC ACID 135 MG/1
135 CAPSULE, DELAYED RELEASE ORAL DAILY
Qty: 90 CAPSULE | Refills: 3 | Status: SHIPPED | OUTPATIENT
Start: 2023-03-27

## 2023-03-27 RX ORDER — NIFEDIPINE 60 MG/1
TABLET, EXTENDED RELEASE ORAL
Qty: 90 TABLET | Refills: 3 | Status: SHIPPED | OUTPATIENT
Start: 2023-03-27

## 2023-04-20 ENCOUNTER — TELEPHONE (OUTPATIENT)
Dept: CARDIOLOGY | Facility: CLINIC | Age: 73
End: 2023-04-20
Payer: MEDICARE

## 2023-04-20 NOTE — TELEPHONE ENCOUNTER
Patient called office requesting scripts with dx be sent to Dr Magallon at [phone] again. Scripts sent to Dr Magallon at [phone].

## 2023-05-04 DIAGNOSIS — I10 ESSENTIAL HYPERTENSION: ICD-10-CM

## 2023-05-04 RX ORDER — NIFEDIPINE 30 MG/1
30 TABLET, FILM COATED, EXTENDED RELEASE ORAL DAILY
Qty: 90 TABLET | Refills: 3 | Status: SHIPPED | OUTPATIENT
Start: 2023-05-04

## 2023-07-25 ENCOUNTER — TELEPHONE (OUTPATIENT)
Dept: CARDIOLOGY | Facility: CLINIC | Age: 73
End: 2023-07-25
Payer: MEDICARE

## 2023-07-25 RX ORDER — UBIDECARENONE 100 MG
100 CAPSULE ORAL DAILY
Qty: 90 CAPSULE | Refills: 3 | Status: SHIPPED | OUTPATIENT
Start: 2023-07-25 | End: 2023-07-27 | Stop reason: SDUPTHER

## 2023-07-27 ENCOUNTER — TELEPHONE (OUTPATIENT)
Dept: CARDIOLOGY | Facility: CLINIC | Age: 73
End: 2023-07-27
Payer: MEDICARE

## 2023-07-27 RX ORDER — UBIDECARENONE 100 MG
100 CAPSULE ORAL DAILY
Qty: 90 CAPSULE | Refills: 3 | Status: SHIPPED | OUTPATIENT
Start: 2023-07-27

## 2023-07-27 NOTE — TELEPHONE ENCOUNTER
----- Message from Cara Flores LPN sent at 7/27/2023  3:49 PM EDT -----  Regarding: FW: CoQ10  Contact: [phone]    ----- Message -----  From: Terrie Damian LPN  Sent: 7/27/2023   8:04 AM EDT  To: Cara Flores LPN  Subject: FW: CoQ10                                          ----- Message -----  From: Carrington Fontaine  Sent: 7/26/2023  11:50 PM EDT  To: [facility] Clinical Pool  Subject: CoQ10                                            Please fax the script to [facility] attn: Dr. Magallon. Fax: [phone]. For Carrington Fontaine, (N last [phone]. Thank you!

## 2023-11-17 ENCOUNTER — TELEPHONE (OUTPATIENT)
Dept: CARDIOLOGY | Facility: CLINIC | Age: 73
End: 2023-11-17
Payer: MEDICARE

## 2023-11-17 NOTE — TELEPHONE ENCOUNTER
Attempted to contact, was unable to reach, no option to press through extension and went directly to the third option since first number was 3.

## 2023-11-17 NOTE — TELEPHONE ENCOUNTER
Hub staff attempted to follow warm transfer process and was unsuccessful     Caller: JESUS    [facility]hip [facility]: [facility]    Best call back number: [phone] (EXT [phone])    Patient is needing: [facility] ON THE PATIENT AND WOULD LIKE TO ENSURE IT HAS BEEN RECEIVED. IT WAS FIRST FAXED ON 11.07.23

## 2023-12-07 ENCOUNTER — TELEPHONE (OUTPATIENT)
Dept: CARDIOLOGY | Facility: CLINIC | Age: 73
End: 2023-12-07
Payer: MEDICARE

## 2023-12-07 NOTE — TELEPHONE ENCOUNTER
Cardiac clearance letter sent.   
Okay.  5 days.  
Received fax from Dr. Lillian Kennedy for cardiac clearance for patient to have an EGD. Patient is on aspirin and they are requesting to hold for 5 days. According to our records, I do not see where patient has had any stenting. Patient had a CABG on 09/22/14.          Fax [phone]    
23